# Patient Record
Sex: FEMALE | NOT HISPANIC OR LATINO | Employment: OTHER | URBAN - METROPOLITAN AREA
[De-identification: names, ages, dates, MRNs, and addresses within clinical notes are randomized per-mention and may not be internally consistent; named-entity substitution may affect disease eponyms.]

---

## 2023-03-01 LAB
ALBUMIN (G/DL) IN SER/PLAS: 4.1 G/DL (ref 3.4–5)
ANION GAP IN SER/PLAS: 15 MMOL/L (ref 10–20)
CALCIUM (MG/DL) IN SER/PLAS: 9.6 MG/DL (ref 8.6–10.6)
CARBON DIOXIDE, TOTAL (MMOL/L) IN SER/PLAS: 27 MMOL/L (ref 21–32)
CHLORIDE (MMOL/L) IN SER/PLAS: 105 MMOL/L (ref 98–107)
CREATININE (MG/DL) IN SER/PLAS: 1.51 MG/DL (ref 0.5–1.05)
GFR FEMALE: 37 ML/MIN/1.73M2
GLUCOSE (MG/DL) IN SER/PLAS: 109 MG/DL (ref 74–99)
PHOSPHATE (MG/DL) IN SER/PLAS: 4.2 MG/DL (ref 2.5–4.9)
POTASSIUM (MMOL/L) IN SER/PLAS: 3.9 MMOL/L (ref 3.5–5.3)
SODIUM (MMOL/L) IN SER/PLAS: 143 MMOL/L (ref 136–145)
UREA NITROGEN (MG/DL) IN SER/PLAS: 17 MG/DL (ref 6–23)

## 2023-04-28 LAB
ALBUMIN (G/DL) IN SER/PLAS: 4.2 G/DL (ref 3.4–5)
ALBUMIN (MG/L) IN URINE: 24.5 MG/L
ALBUMIN/CREATININE (UG/MG) IN URINE: 34.2 UG/MG CRT (ref 0–30)
ANION GAP IN SER/PLAS: 13 MMOL/L (ref 10–20)
APPEARANCE, URINE: CLEAR
BILIRUBIN, URINE: NEGATIVE
BLOOD, URINE: NEGATIVE
CALCIDIOL (25 OH VITAMIN D3) (NG/ML) IN SER/PLAS: 38 NG/ML
CALCIUM (MG/DL) IN SER/PLAS: 9.4 MG/DL (ref 8.6–10.6)
CARBON DIOXIDE, TOTAL (MMOL/L) IN SER/PLAS: 26 MMOL/L (ref 21–32)
CHLORIDE (MMOL/L) IN SER/PLAS: 106 MMOL/L (ref 98–107)
COLOR, URINE: YELLOW
CREATININE (MG/DL) IN SER/PLAS: 1.44 MG/DL (ref 0.5–1.05)
CREATININE (MG/DL) IN URINE: 71.6 MG/DL (ref 20–320)
CREATININE (MG/DL) IN URINE: 71.6 MG/DL (ref 20–320)
ERYTHROCYTE DISTRIBUTION WIDTH (RATIO) BY AUTOMATED COUNT: 14.5 % (ref 11.5–14.5)
ERYTHROCYTE MEAN CORPUSCULAR HEMOGLOBIN CONCENTRATION (G/DL) BY AUTOMATED: 32.6 G/DL (ref 32–36)
ERYTHROCYTE MEAN CORPUSCULAR VOLUME (FL) BY AUTOMATED COUNT: 95 FL (ref 80–100)
ERYTHROCYTES (10*6/UL) IN BLOOD BY AUTOMATED COUNT: 4.03 X10E12/L (ref 4–5.2)
GFR FEMALE: 39 ML/MIN/1.73M2
GLUCOSE (MG/DL) IN SER/PLAS: 101 MG/DL (ref 74–99)
GLUCOSE, URINE: NEGATIVE MG/DL
HEMATOCRIT (%) IN BLOOD BY AUTOMATED COUNT: 38.3 % (ref 36–46)
HEMOGLOBIN (G/DL) IN BLOOD: 12.5 G/DL (ref 12–16)
KETONES, URINE: NEGATIVE MG/DL
LEUKOCYTE ESTERASE, URINE: NEGATIVE
LEUKOCYTES (10*3/UL) IN BLOOD BY AUTOMATED COUNT: 8.9 X10E9/L (ref 4.4–11.3)
NITRITE, URINE: NEGATIVE
NRBC (PER 100 WBCS) BY AUTOMATED COUNT: 0 /100 WBC (ref 0–0)
PARATHYRIN INTACT (PG/ML) IN SER/PLAS: 70.2 PG/ML (ref 18.5–88)
PH, URINE: 6 (ref 5–8)
PHOSPHATE (MG/DL) IN SER/PLAS: 3.6 MG/DL (ref 2.5–4.9)
PLATELETS (10*3/UL) IN BLOOD AUTOMATED COUNT: 277 X10E9/L (ref 150–450)
POTASSIUM (MMOL/L) IN SER/PLAS: 4.3 MMOL/L (ref 3.5–5.3)
PROTEIN (MG/DL) IN URINE: 20 MG/DL (ref 5–24)
PROTEIN, URINE: NEGATIVE MG/DL
PROTEIN/CREATININE (MG/MG) IN URINE: 0.28 MG/MG CREAT (ref 0–0.17)
SODIUM (MMOL/L) IN SER/PLAS: 141 MMOL/L (ref 136–145)
SPECIFIC GRAVITY, URINE: 1.01 (ref 1–1.03)
UREA NITROGEN (MG/DL) IN SER/PLAS: 21 MG/DL (ref 6–23)
UROBILINOGEN, URINE: <2 MG/DL (ref 0–1.9)

## 2023-07-20 ENCOUNTER — HOSPITAL ENCOUNTER (OUTPATIENT)
Dept: DATA CONVERSION | Facility: HOSPITAL | Age: 68
End: 2023-07-20
Attending: INTERNAL MEDICINE
Payer: MEDICARE

## 2023-07-20 DIAGNOSIS — D12.0 BENIGN NEOPLASM OF CECUM: ICD-10-CM

## 2023-07-20 DIAGNOSIS — D12.8 BENIGN NEOPLASM OF RECTUM: ICD-10-CM

## 2023-07-20 DIAGNOSIS — K57.30 DIVERTICULOSIS OF LARGE INTESTINE WITHOUT PERFORATION OR ABSCESS WITHOUT BLEEDING: ICD-10-CM

## 2023-07-20 DIAGNOSIS — D12.5 BENIGN NEOPLASM OF SIGMOID COLON: ICD-10-CM

## 2023-07-20 DIAGNOSIS — Z12.11 ENCOUNTER FOR SCREENING FOR MALIGNANT NEOPLASM OF COLON: ICD-10-CM

## 2023-07-20 DIAGNOSIS — D12.4 BENIGN NEOPLASM OF DESCENDING COLON: ICD-10-CM

## 2023-08-02 LAB
COMPLETE PATHOLOGY REPORT: NORMAL
CONVERTED CLINICAL DIAGNOSIS-HISTORY: NORMAL
CONVERTED FINAL DIAGNOSIS: NORMAL
CONVERTED FINAL REPORT PDF LINK TO COPY AND PASTE: NORMAL
CONVERTED GROSS DESCRIPTION: NORMAL

## 2023-10-03 DIAGNOSIS — E03.9 HYPOTHYROIDISM, UNSPECIFIED TYPE: Primary | ICD-10-CM

## 2023-10-03 RX ORDER — LEVOTHYROXINE SODIUM 150 UG/1
150 TABLET ORAL DAILY
COMMUNITY
Start: 2013-06-19 | End: 2023-10-04 | Stop reason: SDUPTHER

## 2023-10-03 RX ORDER — LEVOTHYROXINE SODIUM 150 UG/1
150 TABLET ORAL DAILY
Qty: 90 TABLET | Refills: 0 | Status: CANCELLED | OUTPATIENT
Start: 2023-10-03

## 2023-10-04 DIAGNOSIS — E03.9 HYPOTHYROIDISM, UNSPECIFIED TYPE: Primary | ICD-10-CM

## 2023-10-04 RX ORDER — LEVOTHYROXINE SODIUM 150 UG/1
150 TABLET ORAL DAILY
Qty: 90 TABLET | Refills: 3 | Status: SHIPPED | OUTPATIENT
Start: 2023-10-04 | End: 2024-01-15 | Stop reason: DRUGHIGH

## 2023-12-12 ENCOUNTER — LAB (OUTPATIENT)
Dept: LAB | Facility: LAB | Age: 68
End: 2023-12-12
Payer: MEDICARE

## 2023-12-12 DIAGNOSIS — N18.30 CHRONIC KIDNEY DISEASE, STAGE 3 UNSPECIFIED (MULTI): Primary | ICD-10-CM

## 2023-12-12 LAB
ALBUMIN SERPL BCP-MCNC: 4.3 G/DL (ref 3.4–5)
ANION GAP SERPL CALC-SCNC: 15 MMOL/L (ref 10–20)
APPEARANCE UR: CLEAR
BILIRUB UR STRIP.AUTO-MCNC: NEGATIVE MG/DL
BUN SERPL-MCNC: 18 MG/DL (ref 6–23)
CALCIUM SERPL-MCNC: 9.7 MG/DL (ref 8.6–10.6)
CHLORIDE SERPL-SCNC: 105 MMOL/L (ref 98–107)
CO2 SERPL-SCNC: 26 MMOL/L (ref 21–32)
COLOR UR: YELLOW
CREAT SERPL-MCNC: 1.51 MG/DL (ref 0.5–1.05)
CREAT UR-MCNC: 73.9 MG/DL (ref 20–320)
CREAT UR-MCNC: 73.9 MG/DL (ref 20–320)
ERYTHROCYTE [DISTWIDTH] IN BLOOD BY AUTOMATED COUNT: 14.3 % (ref 11.5–14.5)
GFR SERPL CREATININE-BSD FRML MDRD: 37 ML/MIN/1.73M*2
GLUCOSE SERPL-MCNC: 108 MG/DL (ref 74–99)
GLUCOSE UR STRIP.AUTO-MCNC: NEGATIVE MG/DL
HCT VFR BLD AUTO: 36.1 % (ref 36–46)
HGB BLD-MCNC: 12.3 G/DL (ref 12–16)
HYALINE CASTS #/AREA URNS AUTO: ABNORMAL /LPF
KETONES UR STRIP.AUTO-MCNC: NEGATIVE MG/DL
LEUKOCYTE ESTERASE UR QL STRIP.AUTO: ABNORMAL
MAGNESIUM SERPL-MCNC: 2.16 MG/DL (ref 1.6–2.4)
MCH RBC QN AUTO: 31.2 PG (ref 26–34)
MCHC RBC AUTO-ENTMCNC: 34.1 G/DL (ref 32–36)
MCV RBC AUTO: 92 FL (ref 80–100)
MICROALBUMIN UR-MCNC: 96.1 MG/L
MICROALBUMIN/CREAT UR: 130 UG/MG CREAT
NITRITE UR QL STRIP.AUTO: NEGATIVE
NRBC BLD-RTO: 0 /100 WBCS (ref 0–0)
PH UR STRIP.AUTO: 6 [PH]
PHOSPHATE SERPL-MCNC: 4.2 MG/DL (ref 2.5–4.9)
PLATELET # BLD AUTO: 235 X10*3/UL (ref 150–450)
POTASSIUM SERPL-SCNC: 4 MMOL/L (ref 3.5–5.3)
PROT UR STRIP.AUTO-MCNC: ABNORMAL MG/DL
PROT UR-ACNC: 39 MG/DL (ref 5–24)
PROT/CREAT UR: 0.53 MG/MG CREAT (ref 0–0.17)
RBC # BLD AUTO: 3.94 X10*6/UL (ref 4–5.2)
RBC # UR STRIP.AUTO: NEGATIVE /UL
RBC #/AREA URNS AUTO: ABNORMAL /HPF
SODIUM SERPL-SCNC: 142 MMOL/L (ref 136–145)
SP GR UR STRIP.AUTO: 1.01
SQUAMOUS #/AREA URNS AUTO: ABNORMAL /HPF
UROBILINOGEN UR STRIP.AUTO-MCNC: <2 MG/DL
WBC # BLD AUTO: 5.7 X10*3/UL (ref 4.4–11.3)
WBC #/AREA URNS AUTO: ABNORMAL /HPF

## 2023-12-12 PROCEDURE — 36415 COLL VENOUS BLD VENIPUNCTURE: CPT

## 2023-12-12 PROCEDURE — 84156 ASSAY OF PROTEIN URINE: CPT

## 2023-12-12 PROCEDURE — 80069 RENAL FUNCTION PANEL: CPT

## 2023-12-12 PROCEDURE — 82570 ASSAY OF URINE CREATININE: CPT

## 2023-12-12 PROCEDURE — 81001 URINALYSIS AUTO W/SCOPE: CPT

## 2023-12-12 PROCEDURE — 85027 COMPLETE CBC AUTOMATED: CPT

## 2023-12-12 PROCEDURE — 82043 UR ALBUMIN QUANTITATIVE: CPT

## 2023-12-12 PROCEDURE — 83735 ASSAY OF MAGNESIUM: CPT

## 2023-12-18 ENCOUNTER — TELEMEDICINE (OUTPATIENT)
Dept: NEPHROLOGY | Facility: CLINIC | Age: 68
End: 2023-12-18
Payer: MEDICARE

## 2023-12-18 DIAGNOSIS — N18.32 HYPERTENSIVE KIDNEY DISEASE WITH STAGE 3B CHRONIC KIDNEY DISEASE (MULTI): Primary | ICD-10-CM

## 2023-12-18 DIAGNOSIS — I12.9 HYPERTENSIVE KIDNEY DISEASE WITH STAGE 3B CHRONIC KIDNEY DISEASE (MULTI): Primary | ICD-10-CM

## 2023-12-18 DIAGNOSIS — E55.9 VITAMIN D DEFICIENCY: Primary | ICD-10-CM

## 2023-12-18 PROCEDURE — 99441 PR PHYS/QHP TELEPHONE EVALUATION 5-10 MIN: CPT | Performed by: INTERNAL MEDICINE

## 2023-12-18 RX ORDER — LISINOPRIL 40 MG/1
40 TABLET ORAL DAILY
Qty: 90 TABLET | Refills: 1 | Status: SHIPPED | OUTPATIENT
Start: 2023-12-18 | End: 2024-12-17

## 2023-12-18 NOTE — PROGRESS NOTES
An interactive audio telecommunication system which permits real time communications between the patient (at the originating site) and provider (at the distant site) was utilized to provide this telehealth service.  Verbal consent was requested and obtained from patient on this date for a telehealth visit.    Chief complaint: Follow up CKD    No new complaints  No recent hospitalizations  Denies nausea, vomiting, chest pain, dyspnea  No urinary symptoms     Home Blood pressures 170s, generally 140-150s  Weight stable  No edema    Stage CKD 3b, stable  Mild proteinuria  HTN not at goal    Increase in lisinopril to 40 mg daily  Labs in 2 weeks  Labs and follow up in 3 months    Phone visit 8 minutes

## 2023-12-19 RX ORDER — ACETAMINOPHEN 500 MG
TABLET ORAL DAILY
Qty: 90 CAPSULE | Refills: 1 | Status: SHIPPED | OUTPATIENT
Start: 2023-12-19 | End: 2024-05-06

## 2024-01-04 PROBLEM — E66.01 MORBID OBESITY (MULTI): Status: ACTIVE | Noted: 2024-01-04

## 2024-01-04 PROBLEM — L30.9 ECZEMA: Status: ACTIVE | Noted: 2024-01-04

## 2024-01-04 PROBLEM — E55.9 VITAMIN D DEFICIENCY: Status: ACTIVE | Noted: 2024-01-04

## 2024-01-04 PROBLEM — E87.6 HYPOKALEMIA: Status: ACTIVE | Noted: 2024-01-04

## 2024-01-04 PROBLEM — E78.1 ESSENTIAL HYPERTRIGLYCERIDEMIA: Status: ACTIVE | Noted: 2024-01-04

## 2024-01-04 PROBLEM — L73.9 FOLLICULITIS: Status: ACTIVE | Noted: 2024-01-04

## 2024-01-04 PROBLEM — N39.0 URINARY TRACT INFECTION, ACUTE: Status: ACTIVE | Noted: 2024-01-04

## 2024-01-04 PROBLEM — M54.50 LOW BACK PAIN: Status: ACTIVE | Noted: 2024-01-04

## 2024-01-04 PROBLEM — I10 HYPERTENSION: Status: ACTIVE | Noted: 2024-01-04

## 2024-01-04 PROBLEM — E78.2 MIXED HYPERLIPIDEMIA: Status: ACTIVE | Noted: 2024-01-04

## 2024-01-04 PROBLEM — E66.9 OBESITY: Status: ACTIVE | Noted: 2024-01-04

## 2024-01-04 PROBLEM — F41.9 ANXIETY: Status: ACTIVE | Noted: 2024-01-04

## 2024-01-04 PROBLEM — N28.9 ABNORMAL KIDNEY FUNCTION: Status: ACTIVE | Noted: 2024-01-04

## 2024-01-04 PROBLEM — M54.16 LUMBAR RADICULOPATHY: Status: ACTIVE | Noted: 2024-01-04

## 2024-01-04 PROBLEM — G57.12 MERALGIA PARESTHETICA OF LEFT SIDE: Status: ACTIVE | Noted: 2024-01-04

## 2024-01-04 PROBLEM — H25.11 AGE-RELATED NUCLEAR CATARACT OF RIGHT EYE: Status: ACTIVE | Noted: 2019-04-01

## 2024-01-04 PROBLEM — C95.91 LEUKEMIA IN REMISSION (MULTI): Status: ACTIVE | Noted: 2024-01-04

## 2024-01-04 PROBLEM — M16.0 PRIMARY OSTEOARTHRITIS OF BOTH HIPS: Status: ACTIVE | Noted: 2024-01-04

## 2024-01-04 PROBLEM — E03.9 HYPOTHYROIDISM: Status: ACTIVE | Noted: 2024-01-04

## 2024-01-04 PROBLEM — H25.12 NUCLEAR SCLEROTIC CATARACT OF LEFT EYE: Status: ACTIVE | Noted: 2019-04-08

## 2024-01-04 PROBLEM — R73.9 HYPERGLYCEMIA: Status: ACTIVE | Noted: 2024-01-04

## 2024-01-04 PROBLEM — I10 BENIGN ESSENTIAL HYPERTENSION: Status: ACTIVE | Noted: 2024-01-04

## 2024-01-04 PROBLEM — N18.30 CKD (CHRONIC KIDNEY DISEASE) STAGE 3, GFR 30-59 ML/MIN (MULTI): Status: ACTIVE | Noted: 2024-01-04

## 2024-01-04 RX ORDER — FLUTICASONE PROPIONATE 50 MCG
SPRAY, SUSPENSION (ML) NASAL
COMMUNITY
End: 2024-04-19 | Stop reason: WASHOUT

## 2024-01-04 RX ORDER — FAMOTIDINE 20 MG/1
20 TABLET, FILM COATED ORAL DAILY
COMMUNITY
Start: 2023-07-31 | End: 2024-01-11 | Stop reason: ALTCHOICE

## 2024-01-04 RX ORDER — AMLODIPINE BESYLATE 10 MG/1
TABLET ORAL EVERY 24 HOURS
COMMUNITY
End: 2024-04-19 | Stop reason: ALTCHOICE

## 2024-01-04 RX ORDER — CELECOXIB 100 MG/1
1 CAPSULE ORAL EVERY 12 HOURS
COMMUNITY
End: 2024-01-11 | Stop reason: ALTCHOICE

## 2024-01-04 RX ORDER — IMATINIB MESYLATE 400 MG/1
400 TABLET, FILM COATED ORAL EVERY 24 HOURS
COMMUNITY

## 2024-01-04 RX ORDER — POTASSIUM CHLORIDE 750 MG/1
50 TABLET, EXTENDED RELEASE ORAL DAILY
COMMUNITY
Start: 2023-10-02

## 2024-01-11 ENCOUNTER — OFFICE VISIT (OUTPATIENT)
Dept: GERIATRIC MEDICINE | Facility: CLINIC | Age: 69
End: 2024-01-11
Payer: MEDICARE

## 2024-01-11 VITALS
TEMPERATURE: 98.2 F | RESPIRATION RATE: 18 BRPM | BODY MASS INDEX: 37.06 KG/M2 | DIASTOLIC BLOOD PRESSURE: 102 MMHG | WEIGHT: 229.6 LBS | SYSTOLIC BLOOD PRESSURE: 173 MMHG

## 2024-01-11 DIAGNOSIS — I12.9 HYPERTENSIVE KIDNEY DISEASE WITH STAGE 3B CHRONIC KIDNEY DISEASE (MULTI): ICD-10-CM

## 2024-01-11 DIAGNOSIS — N18.32 HYPERTENSIVE KIDNEY DISEASE WITH STAGE 3B CHRONIC KIDNEY DISEASE (MULTI): ICD-10-CM

## 2024-01-11 DIAGNOSIS — E03.9 HYPOTHYROIDISM, UNSPECIFIED TYPE: ICD-10-CM

## 2024-01-11 DIAGNOSIS — Z00.00 GENERAL MEDICAL EXAMINATION: Primary | ICD-10-CM

## 2024-01-11 DIAGNOSIS — Z12.31 SCREENING MAMMOGRAM, ENCOUNTER FOR: ICD-10-CM

## 2024-01-11 LAB
ALBUMIN SERPL BCP-MCNC: 4.4 G/DL (ref 3.4–5)
ANION GAP SERPL CALC-SCNC: 16 MMOL/L (ref 10–20)
BUN SERPL-MCNC: 20 MG/DL (ref 6–23)
CALCIUM SERPL-MCNC: 9.8 MG/DL (ref 8.6–10.6)
CHLORIDE SERPL-SCNC: 104 MMOL/L (ref 98–107)
CO2 SERPL-SCNC: 26 MMOL/L (ref 21–32)
CREAT SERPL-MCNC: 1.56 MG/DL (ref 0.5–1.05)
EGFRCR SERPLBLD CKD-EPI 2021: 36 ML/MIN/1.73M*2
ERYTHROCYTE [DISTWIDTH] IN BLOOD BY AUTOMATED COUNT: 14.2 % (ref 11.5–14.5)
GLUCOSE SERPL-MCNC: 104 MG/DL (ref 74–99)
HCT VFR BLD AUTO: 38 % (ref 36–46)
HGB BLD-MCNC: 12.6 G/DL (ref 12–16)
MCH RBC QN AUTO: 30.9 PG (ref 26–34)
MCHC RBC AUTO-ENTMCNC: 33.2 G/DL (ref 32–36)
MCV RBC AUTO: 93 FL (ref 80–100)
NRBC BLD-RTO: 0 /100 WBCS (ref 0–0)
PHOSPHATE SERPL-MCNC: 3.7 MG/DL (ref 2.5–4.9)
PLATELET # BLD AUTO: 240 X10*3/UL (ref 150–450)
POTASSIUM SERPL-SCNC: 3.7 MMOL/L (ref 3.5–5.3)
RBC # BLD AUTO: 4.08 X10*6/UL (ref 4–5.2)
SODIUM SERPL-SCNC: 142 MMOL/L (ref 136–145)
T4 FREE SERPL-MCNC: 1.47 NG/DL (ref 0.78–1.48)
TSH SERPL-ACNC: 0.07 MIU/L (ref 0.44–3.98)
WBC # BLD AUTO: 6.8 X10*3/UL (ref 4.4–11.3)

## 2024-01-11 PROCEDURE — 1036F TOBACCO NON-USER: CPT | Performed by: NURSE PRACTITIONER

## 2024-01-11 PROCEDURE — 1159F MED LIST DOCD IN RCRD: CPT | Performed by: NURSE PRACTITIONER

## 2024-01-11 PROCEDURE — 36415 COLL VENOUS BLD VENIPUNCTURE: CPT | Performed by: NURSE PRACTITIONER

## 2024-01-11 PROCEDURE — 84443 ASSAY THYROID STIM HORMONE: CPT | Performed by: NURSE PRACTITIONER

## 2024-01-11 PROCEDURE — 99215 OFFICE O/P EST HI 40 MIN: CPT | Performed by: NURSE PRACTITIONER

## 2024-01-11 PROCEDURE — 3077F SYST BP >= 140 MM HG: CPT | Performed by: NURSE PRACTITIONER

## 2024-01-11 PROCEDURE — 85027 COMPLETE CBC AUTOMATED: CPT | Performed by: INTERNAL MEDICINE

## 2024-01-11 PROCEDURE — 1126F AMNT PAIN NOTED NONE PRSNT: CPT | Performed by: NURSE PRACTITIONER

## 2024-01-11 PROCEDURE — 84439 ASSAY OF FREE THYROXINE: CPT | Performed by: NURSE PRACTITIONER

## 2024-01-11 PROCEDURE — 3080F DIAST BP >= 90 MM HG: CPT | Performed by: NURSE PRACTITIONER

## 2024-01-11 PROCEDURE — 84100 ASSAY OF PHOSPHORUS: CPT | Performed by: INTERNAL MEDICINE

## 2024-01-11 ASSESSMENT — ENCOUNTER SYMPTOMS
DYSPHORIC MOOD: 0
TROUBLE SWALLOWING: 0
SLEEP DISTURBANCE: 0
COUGH: 0
NUMBNESS: 0
TREMORS: 0
WEAKNESS: 0
BLOOD IN STOOL: 0
DECREASED CONCENTRATION: 0
RECTAL PAIN: 0
SINUS PAIN: 0
DYSURIA: 0
COLOR CHANGE: 0
SINUS PRESSURE: 0
DIARRHEA: 0
CHEST TIGHTNESS: 0
RHINORRHEA: 0
HEMATURIA: 0
SEIZURES: 0
FATIGUE: 0
PALPITATIONS: 1
APPETITE CHANGE: 0
HEADACHES: 0
CONFUSION: 0
LOSS OF SENSATION IN FEET: 0
NAUSEA: 0
SPEECH DIFFICULTY: 0
DIZZINESS: 0
OCCASIONAL FEELINGS OF UNSTEADINESS: 0
CONSTIPATION: 0
CHOKING: 0
FEVER: 0
SHORTNESS OF BREATH: 0
WHEEZING: 0
BRUISES/BLEEDS EASILY: 0

## 2024-01-11 ASSESSMENT — PATIENT HEALTH QUESTIONNAIRE - PHQ9
2. FEELING DOWN, DEPRESSED OR HOPELESS: NOT AT ALL
SUM OF ALL RESPONSES TO PHQ9 QUESTIONS 1 AND 2: 0
1. LITTLE INTEREST OR PLEASURE IN DOING THINGS: NOT AT ALL

## 2024-01-11 ASSESSMENT — PAIN SCALES - GENERAL: PAINLEVEL: 0-NO PAIN

## 2024-01-11 NOTE — PROGRESS NOTES
Subjective   Patient ID: Cleo Corral is a 68 y.o. female who presents for No chief complaint on file..      HPI Seen today for follow up primary care visit. Ms. Corral was last seen by me 7/6/23.  Per patient discussion/provider impression:  Having more epigastric discomfort- begin famotidine, if this is not helpful will change to omeprazole.  HTN: BP  fairly well controlled  Needs thyroid checked due to hypothyroid  CKD: follows with nephrology  Colonoscopy done July 2023: several polyps removed - pathology shows tubular ademomas, diverticulosis was also noted. Plan is for follow up colonoscopy in 6 months, this is scheduled for Feb 15th.  Shingrix series completed   Hyperglycemia: HgbA1c stable at 5.6%  Knee pain: improved with steroid injections      Since last visit:  Established care with Heme-onc at [facility] (due to insurance changes) for CML. She continues on imatnib mesylate 400mg daily. She is having labs monitored. Needs echo due to murmur. CBC stable, no need for transfusion. CKD stable, followed by nephrology at   Seen by nephrology, Dr. Pruitt (12/28/23)- continue ACE- lisinopril increased to 40mg due to elevated BP. Labs checked, EFGR: 37    Things to consider this visit:  TSH  Mammogram  PCV-20     Review of Systems   Constitutional:  Negative for appetite change, diaphoresis, fatigue and fever.   HENT:  Negative for congestion, postnasal drip, rhinorrhea, sinus pressure, sinus pain and trouble swallowing.    Eyes:  Negative for visual disturbance.        Follows with retinal specialist- last seen November 2023   Respiratory:  Negative for cough, choking, chest tightness, shortness of breath and wheezing.    Cardiovascular:  Positive for palpitations. Negative for leg swelling.        Occasional palpitations when lying down- present for a few months- resolves quickly without intervention and no associated symptoms    Gastrointestinal:  Negative for blood in stool, constipation, diarrhea, nausea  and rectal pain.        Heartburn has resolved - no longer taking pepcid    Genitourinary:  Negative for dysuria, hematuria, pelvic pain, urgency, vaginal bleeding, vaginal discharge and vaginal pain.   Musculoskeletal:         No joint pain- hasn't required any repeat steroid injection   Skin:  Negative for color change and rash.   Allergic/Immunologic: Negative for environmental allergies.   Neurological:  Negative for dizziness, tremors, seizures, syncope, speech difficulty, weakness, numbness and headaches.   Hematological:  Does not bruise/bleed easily.   Psychiatric/Behavioral:  Negative for behavioral problems, confusion, decreased concentration, dysphoric mood and sleep disturbance.        Objective   Physical Exam  Constitutional:       Appearance: Normal appearance.   HENT:      Head: Normocephalic and atraumatic.   Eyes:      Extraocular Movements: Extraocular movements intact.      Conjunctiva/sclera: Conjunctivae normal.      Pupils: Pupils are equal, round, and reactive to light.   Musculoskeletal:      Cervical back: Normal range of motion.   Skin:     General: Skin is warm and dry.      Comments: Small fluid filled cyst on chest, mobile, soft, NT, unable to express any drainage, no discoloration.   Neurological:      General: No focal deficit present.      Mental Status: She is alert and oriented to person, place, and time.   Psychiatric:         Mood and Affect: Mood normal.         Behavior: Behavior normal.         Thought Content: Thought content normal.         Judgment: Judgment normal.         Assessment/Plan      # Hypertension  - BP not at goal. On arrival BP quite elevated, improvement after sitting for several minutes  - Lisinopril recently increased to 40 mg daily   - Continues on amlodipine 10mg daily  - Ms. Corral will monitor her blood pressures at home   - RFP ordered by Dr. 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

## 2024-01-11 NOTE — PATIENT INSTRUCTIONS
High blood pressure     Your blood pressure was initially high but then improved on recheck  Continue Lisinopril and amlodipine  Monitor your blood pressure  EKG today was okay  You have a murmur- I will order echocardiogram at next visit   Follow up in 3 months for BP check     Hypothyroid  - check TSH today     Osteoarthritis  no complaints of pain since your knee steroid injections   2. it is safe to use acetaminophen for pain     Renal disease  - continue follow up with Dr. Pruitt    Heartburn   Resolved, use famotidine (Pepcid) if needed     Health Maintenance  mammogram ordered today  2.  EKG done today  3. you have repeat colonoscopy in February   4. PCV-20 at [facility]    Follow up in 3 months or sooner if needed

## 2024-01-11 NOTE — ASSESSMENT & PLAN NOTE
Recent adjustment in blood pressure medications due to persistent elevated blood pressure. Initial BP was elevated, repeat /84.  2.   Continue to monitor your blood pressure  3.  Follow up in 3 months for BP check   4. You have a murmur, EKG today showed some changes related to your high blood pressure (left ventricular hypertrophy)- I will order  an echocardiogram at your next visit

## 2024-01-12 ENCOUNTER — TELEPHONE (OUTPATIENT)
Dept: GERIATRIC MEDICINE | Facility: CLINIC | Age: 69
End: 2024-01-12
Payer: MEDICARE

## 2024-01-12 NOTE — TELEPHONE ENCOUNTER
Pt called and stated that both of you did a wonderful job yesterday. She stated she wants to thank both of you for the time and effort that you both took to care for her. She is very thankful and appreciative. :-)

## 2024-01-13 ASSESSMENT — ENCOUNTER SYMPTOMS: DIAPHORESIS: 0

## 2024-01-15 ENCOUNTER — DOCUMENTATION (OUTPATIENT)
Dept: GERIATRIC MEDICINE | Facility: CLINIC | Age: 69
End: 2024-01-15
Payer: MEDICARE

## 2024-01-15 DIAGNOSIS — E03.9 ACQUIRED HYPOTHYROIDISM: Primary | ICD-10-CM

## 2024-01-15 RX ORDER — LEVOTHYROXINE SODIUM 137 UG/1
137 TABLET ORAL DAILY
Qty: 90 TABLET | Refills: 3 | Status: SHIPPED | OUTPATIENT
Start: 2024-01-15 | End: 2024-04-12 | Stop reason: DRUGHIGH

## 2024-01-15 NOTE — PROGRESS NOTES
Phone call with Ms. Corral to review lab work today.     Plan to decrease levothyroxine to 137mcg daily and recheck labs in 6-8 weeks.   Ms. Shayna  verbalizes understanding and has no questions at this time.

## 2024-01-17 ENCOUNTER — LAB (OUTPATIENT)
Dept: LAB | Facility: LAB | Age: 69
End: 2024-01-17
Payer: MEDICARE

## 2024-01-17 LAB
APPEARANCE UR: CLEAR
BILIRUB UR STRIP.AUTO-MCNC: NEGATIVE MG/DL
COLOR UR: COLORLESS
CREAT UR-MCNC: 41.3 MG/DL
GLUCOSE UR STRIP.AUTO-MCNC: NORMAL MG/DL
KETONES UR STRIP.AUTO-MCNC: NEGATIVE MG/DL
LEUKOCYTE ESTERASE UR QL STRIP.AUTO: NEGATIVE
NITRITE UR QL STRIP.AUTO: NEGATIVE
PH UR STRIP.AUTO: 5.5 [PH]
PROT UR STRIP.AUTO-MCNC: NEGATIVE MG/DL
PROT UR-MCNC: 9 MG/DL
PROT/CREAT UR: NORMAL MG/G{CREAT}
RBC # UR STRIP.AUTO: NEGATIVE /UL
SP GR UR STRIP.AUTO: 1.01
UROBILINOGEN UR STRIP.AUTO-MCNC: NORMAL MG/DL

## 2024-01-17 PROCEDURE — 82570 ASSAY OF URINE CREATININE: CPT | Performed by: INTERNAL MEDICINE

## 2024-01-17 PROCEDURE — 81003 URINALYSIS AUTO W/O SCOPE: CPT

## 2024-01-26 ENCOUNTER — HOSPITAL ENCOUNTER (OUTPATIENT)
Dept: RADIOLOGY | Facility: CLINIC | Age: 69
Discharge: HOME | End: 2024-01-26
Payer: MEDICARE

## 2024-01-26 VITALS — WEIGHT: 229.28 LBS | BODY MASS INDEX: 36.85 KG/M2 | HEIGHT: 66 IN

## 2024-01-26 DIAGNOSIS — Z12.31 SCREENING MAMMOGRAM, ENCOUNTER FOR: ICD-10-CM

## 2024-01-26 DIAGNOSIS — Z00.00 GENERAL MEDICAL EXAMINATION: ICD-10-CM

## 2024-01-26 PROCEDURE — 77063 BREAST TOMOSYNTHESIS BI: CPT | Performed by: RADIOLOGY

## 2024-01-26 PROCEDURE — 77067 SCR MAMMO BI INCL CAD: CPT

## 2024-01-26 PROCEDURE — 77067 SCR MAMMO BI INCL CAD: CPT | Performed by: RADIOLOGY

## 2024-02-15 ENCOUNTER — HOSPITAL ENCOUNTER (OUTPATIENT)
Dept: GASTROENTEROLOGY | Facility: HOSPITAL | Age: 69
Setting detail: OUTPATIENT SURGERY
Discharge: HOME | End: 2024-02-15
Payer: MEDICARE

## 2024-02-15 VITALS
DIASTOLIC BLOOD PRESSURE: 80 MMHG | BODY MASS INDEX: 35.93 KG/M2 | HEIGHT: 66 IN | TEMPERATURE: 97.2 F | OXYGEN SATURATION: 94 % | HEART RATE: 82 BPM | RESPIRATION RATE: 16 BRPM | SYSTOLIC BLOOD PRESSURE: 111 MMHG | WEIGHT: 223.55 LBS

## 2024-02-15 DIAGNOSIS — K63.5 POLYP OF COLON: Primary | ICD-10-CM

## 2024-02-15 PROCEDURE — 2500000004 HC RX 250 GENERAL PHARMACY W/ HCPCS (ALT 636 FOR OP/ED): Performed by: INTERNAL MEDICINE

## 2024-02-15 PROCEDURE — 88305 TISSUE EXAM BY PATHOLOGIST: CPT | Performed by: PATHOLOGY

## 2024-02-15 PROCEDURE — 45381 COLONOSCOPY SUBMUCOUS NJX: CPT | Performed by: INTERNAL MEDICINE

## 2024-02-15 PROCEDURE — 45385 COLONOSCOPY W/LESION REMOVAL: CPT | Performed by: INTERNAL MEDICINE

## 2024-02-15 PROCEDURE — G0500 MOD SEDAT ENDO SERVICE >5YRS: HCPCS | Performed by: INTERNAL MEDICINE

## 2024-02-15 PROCEDURE — 0753T DGTZ GLS MCRSCP SLD LEVEL IV: CPT | Mod: TC,SUR,AHULAB | Performed by: INTERNAL MEDICINE

## 2024-02-15 PROCEDURE — 7100000009 HC PHASE TWO TIME - INITIAL BASE CHARGE

## 2024-02-15 PROCEDURE — 7100000010 HC PHASE TWO TIME - EACH INCREMENTAL 1 MINUTE

## 2024-02-15 PROCEDURE — 99153 MOD SED SAME PHYS/QHP EA: CPT | Performed by: INTERNAL MEDICINE

## 2024-02-15 PROCEDURE — 3700000012 HC SEDATION LEVEL 5+ TIME - INITIAL 15 MINUTES 5/> YEARS

## 2024-02-15 PROCEDURE — 3700000013 HC SEDATION LEVEL 5+ TIME - EACH ADDITIONAL 15 MINUTES

## 2024-02-15 RX ORDER — MIDAZOLAM HYDROCHLORIDE 1 MG/ML
INJECTION, SOLUTION INTRAMUSCULAR; INTRAVENOUS AS NEEDED
Status: COMPLETED | OUTPATIENT
Start: 2024-02-15 | End: 2024-02-15

## 2024-02-15 RX ORDER — MEPERIDINE HYDROCHLORIDE 25 MG/ML
INJECTION INTRAMUSCULAR; INTRAVENOUS; SUBCUTANEOUS AS NEEDED
Status: COMPLETED | OUTPATIENT
Start: 2024-02-15 | End: 2024-02-15

## 2024-02-15 RX ORDER — SODIUM CHLORIDE, SODIUM LACTATE, POTASSIUM CHLORIDE, CALCIUM CHLORIDE 600; 310; 30; 20 MG/100ML; MG/100ML; MG/100ML; MG/100ML
20 INJECTION, SOLUTION INTRAVENOUS CONTINUOUS
Status: DISCONTINUED | OUTPATIENT
Start: 2024-02-15 | End: 2024-02-16 | Stop reason: HOSPADM

## 2024-02-15 RX ADMIN — MIDAZOLAM HYDROCHLORIDE 1 MG: 1 INJECTION INTRAMUSCULAR; INTRAVENOUS at 10:40

## 2024-02-15 RX ADMIN — MEPERIDINE HYDROCHLORIDE 50 MG: 25 INJECTION INTRAMUSCULAR; INTRAVENOUS; SUBCUTANEOUS at 10:31

## 2024-02-15 RX ADMIN — MEPERIDINE HYDROCHLORIDE 25 MG: 25 INJECTION INTRAMUSCULAR; INTRAVENOUS; SUBCUTANEOUS at 10:40

## 2024-02-15 RX ADMIN — MIDAZOLAM HYDROCHLORIDE 2 MG: 1 INJECTION INTRAMUSCULAR; INTRAVENOUS at 10:31

## 2024-02-15 RX ADMIN — MEPERIDINE HYDROCHLORIDE 25 MG: 25 INJECTION INTRAMUSCULAR; INTRAVENOUS; SUBCUTANEOUS at 10:34

## 2024-02-15 RX ADMIN — MIDAZOLAM HYDROCHLORIDE 1 MG: 1 INJECTION INTRAMUSCULAR; INTRAVENOUS at 10:34

## 2024-02-15 ASSESSMENT — PAIN SCALES - GENERAL
PAINLEVEL_OUTOF10: 4
PAINLEVEL_OUTOF10: 0 - NO PAIN

## 2024-02-15 ASSESSMENT — COLUMBIA-SUICIDE SEVERITY RATING SCALE - C-SSRS
2. HAVE YOU ACTUALLY HAD ANY THOUGHTS OF KILLING YOURSELF?: NO
1. IN THE PAST MONTH, HAVE YOU WISHED YOU WERE DEAD OR WISHED YOU COULD GO TO SLEEP AND NOT WAKE UP?: NO
6. HAVE YOU EVER DONE ANYTHING, STARTED TO DO ANYTHING, OR PREPARED TO DO ANYTHING TO END YOUR LIFE?: NO

## 2024-02-15 ASSESSMENT — PAIN - FUNCTIONAL ASSESSMENT
PAIN_FUNCTIONAL_ASSESSMENT: 0-10

## 2024-02-15 NOTE — PERIOPERATIVE NURSING NOTE
1104 Arrival to post endo. Awake and alert. No complaints. Son brought to bedside.    1113 Dr. Holt at bedside to speak to pt and son.     1119 Discharge instructions reviewed with pt and son, verbalized understanding. Tolerating PO.     1134 PIV removed and pt getting dressed.    1151 Transported to [facility] via wheelchair and discharged to home with son.

## 2024-02-15 NOTE — DISCHARGE INSTRUCTIONS
Patient Instructions after a Colonoscopy      The anesthetics, sedatives or narcotics which were given to you today will be acting in your body for the next 24 hours, so you might feel a little sleepy or groggy.  This feeling should slowly wear off. Carefully read and follow the instructions.     You received sedation today:  - Do not drive or operate any machinery or power tools of any kind.   - No alcoholic beverages today, not even beer or wine.  - Do not make any important decisions or sign any legal documents.  - No over the counter medications that contain alcohol or that may cause drowsiness.  - Do not make any important decisions or sign any legal documents.    While it is common to experience mild to moderate abdominal distention, gas, or belching after your procedure, if any of these symptoms occur following discharge from the GI Lab or within one week of having your procedure, call the [facility] to be advised whether a visit to your nearest Urgent Care or Emergency Department is indicated.  Take this paper with you if you go.     - If you develop an allergic reaction to the medications that were given during your procedure such as difficulty breathing, rash, hives, severe nausea, vomiting or lightheadedness.  - If you experience chest pain, shortness of breath, severe abdominal pain, fevers and chills.  -If you develop signs and symptoms of bleeding such as blood in your spit, if your stools turn black, tarry, or bloody  - If you have not urinated within 8 hours following your procedure.  - If your IV site becomes painful, red, inflamed, or looks infected.    If you received a biopsy/polypectomy/sphincterotomy the following instructions apply below:    __ Do not use Aspirin containing products, non-steroidal medications or anti-coagulants for one week following your procedure. (Examples of these types of medications are: Advil, Arthrotec, Aleve, Coumadin, Ecotrin, Heparin, Ibuprofen,  Indocin, Motrin, Naprosyn, Nuprin, Plavix, Vioxx, and Voltarin, or their generic forms.  This list is not all-inclusive.  Check with your physician or pharmacist before resuming medications.)   __ Eat a soft diet today.  Avoid foods that are poorly digested for the next 24 hours.  These foods would include: nuts, beans, lettuce, red meats, and fried foods. Start with liquids and advance your diet as tolerated, gradually work up to eating solids.   __ Do not have a Barium Study or Enema for one week.    Your physician recommends the additional following instructions:    -You have a contact number available for emergencies. The signs and symptoms of potential delayed complications were discussed with you. You may return to normal activities tomorrow.  -Resume your previous diet.  -Continue your present medications.   -We are waiting for your pathology results.  -Your physician has recommended a repeat colonoscopy (date to be determined after pending pathology results are reviewed) for surveillance based on pathology results.  -The findings and recommendations have been discussed with you.  -The findings and recommendations were discussed with your family.  - Please see Medication Reconciliation Form for new medication/medications prescribed.       If you experience any problems or have any questions following discharge from the GI Lab, please call:    Aston Holt MD  [phone]      Nurse Signature                                                                        Date___________________                                                                            Patient/Responsible Party Signature                                        Date___________________

## 2024-02-15 NOTE — H&P
"History Of Present Illness  Cleo Corral is a 68 y.o. female presenting with hx polyps.     Past Medical History  Past Medical History:   Diagnosis Date    Encounter for gynecological examination (general) (routine) without abnormal findings 2018    Well woman exam with routine gynecological exam    Essential (primary) hypertension     HTN (hypertension), benign    Personal history of malignant neoplasm, unspecified     History of malignant neoplasm     Surgical History  Past Surgical History:   Procedure Laterality Date     SECTION, LOW TRANSVERSE  2015     Section Low Transverse    OTHER SURGICAL HISTORY  2019    Cataract surgery    OTHER SURGICAL HISTORY  2019    Hip replacement     Social History  She reports that she has never smoked. She has never used smokeless tobacco. No history on file for alcohol use and drug use.    Family History  Family History   Problem Relation Name Age of Onset    Breast cancer Mother's Sister          Allergies  No Known Allergies  Review of Systems  Pre-sedation Evaluation:  ASA Classification - ASA 3 - Patient with moderate systemic disease with functional limitations  Mallampati Score - II (hard and soft palate, upper portion of tonsils anduvula visible)    Physical Exam  Vitals reviewed.   Constitutional:       Appearance: Normal appearance.   Cardiovascular:      Rate and Rhythm: Normal rate and regular rhythm.      Heart sounds: Murmur heard.   Pulmonary:      Breath sounds: Normal breath sounds.   Abdominal:      General: Bowel sounds are normal.      Palpations: Abdomen is soft.      Tenderness: There is no abdominal tenderness.   Neurological:      Mental Status: She is alert.          Last Recorded Vitals  Pulse 91, temperature 36.4 °C (97.5 °F), temperature source Temporal, resp. rate 16, height 1.676 m (5' 6\"), weight 101 kg (223 lb 8.7 oz), SpO2 97 %.     Assessment/Plan   R/O neoplasm for colonoscopy     PTA/Current " Medications:  (Not in a hospital admission)    Current Outpatient Medications   Medication Sig Dispense Refill    imatinib (Gleevec) 100 mg tablet once every 24 hours.      levothyroxine (Synthroid, Levoxyl) 137 mcg tablet Take 1 tablet (137 mcg) by mouth once daily. 90 tablet 3    lisinopril 40 mg tablet Take 1 tablet (40 mg) by mouth once daily. 90 tablet 1    potassium chloride CR 10 mEq ER tablet       amLODIPine (Norvasc) 10 mg tablet once every 24 hours.      cholecalciferol (Vitamin D-3) 50 mcg (2,000 unit) capsule TAKE 1 CAPSULE BY MOUTH EVERY DAY 90 capsule 1    fluticasone (Flonase) 50 mcg/actuation nasal spray Administer into affected nostril(s).       No current facility-administered medications for this encounter.     Aston Holt MD

## 2024-02-16 ASSESSMENT — PAIN SCALES - GENERAL: PAINLEVEL_OUTOF10: 0 - NO PAIN

## 2024-02-16 NOTE — ADDENDUM NOTE
Encounter addended by: Carol Herman RN on: 2/16/2024 8:29 AM   Actions taken: Contacts section saved, Flowsheet macro applied, Flowsheet accepted

## 2024-02-18 DIAGNOSIS — N18.32 STAGE 3B CHRONIC KIDNEY DISEASE (MULTI): Primary | ICD-10-CM

## 2024-02-19 LAB
LABORATORY COMMENT REPORT: NORMAL
PATH REPORT.FINAL DX SPEC: NORMAL
PATH REPORT.GROSS SPEC: NORMAL
PATH REPORT.TOTAL CANCER: NORMAL

## 2024-02-20 RX ORDER — LISINOPRIL 20 MG/1
20 TABLET ORAL DAILY
Qty: 90 TABLET | Refills: 1 | Status: SHIPPED | OUTPATIENT
Start: 2024-02-20 | End: 2024-04-11 | Stop reason: DRUGHIGH

## 2024-04-11 ENCOUNTER — APPOINTMENT (OUTPATIENT)
Dept: GERIATRIC MEDICINE | Facility: CLINIC | Age: 69
End: 2024-04-11
Payer: MEDICARE

## 2024-04-11 ENCOUNTER — OFFICE VISIT (OUTPATIENT)
Dept: GERIATRIC MEDICINE | Facility: CLINIC | Age: 69
End: 2024-04-11
Payer: MEDICARE

## 2024-04-11 VITALS
DIASTOLIC BLOOD PRESSURE: 102 MMHG | SYSTOLIC BLOOD PRESSURE: 173 MMHG | BODY MASS INDEX: 37.03 KG/M2 | HEART RATE: 76 BPM | TEMPERATURE: 98.9 F | WEIGHT: 229.4 LBS | RESPIRATION RATE: 18 BRPM

## 2024-04-11 DIAGNOSIS — E87.6 HYPOKALEMIA: ICD-10-CM

## 2024-04-11 DIAGNOSIS — N18.32 HYPERTENSIVE KIDNEY DISEASE WITH STAGE 3B CHRONIC KIDNEY DISEASE (MULTI): ICD-10-CM

## 2024-04-11 DIAGNOSIS — E03.9 HYPOTHYROIDISM, UNSPECIFIED TYPE: Primary | ICD-10-CM

## 2024-04-11 DIAGNOSIS — I10 BENIGN ESSENTIAL HYPERTENSION: ICD-10-CM

## 2024-04-11 DIAGNOSIS — I12.9 HYPERTENSIVE KIDNEY DISEASE WITH STAGE 3B CHRONIC KIDNEY DISEASE (MULTI): ICD-10-CM

## 2024-04-11 PROBLEM — D12.4 BENIGN NEOPLASM OF DESCENDING COLON: Status: ACTIVE | Noted: 2023-07-20

## 2024-04-11 PROBLEM — D12.8 BENIGN NEOPLASM OF RECTUM: Status: ACTIVE | Noted: 2023-07-20

## 2024-04-11 PROBLEM — M21.379 FOOT DROP: Status: ACTIVE | Noted: 2024-04-11

## 2024-04-11 PROBLEM — E78.00 ELEVATED LOW DENSITY LIPOPROTEIN (LDL) CHOLESTEROL LEVEL: Status: ACTIVE | Noted: 2024-04-11

## 2024-04-11 PROBLEM — N63.25 UNSPECIFIED LUMP IN THE LEFT BREAST, OVERLAPPING QUADRANTS: Status: ACTIVE | Noted: 2022-10-28

## 2024-04-11 PROBLEM — N64.4 BREAST TENDERNESS: Status: ACTIVE | Noted: 2022-10-05

## 2024-04-11 PROBLEM — N93.9 ABNORMAL UTERINE BLEEDING: Status: ACTIVE | Noted: 2024-04-11

## 2024-04-11 PROBLEM — N18.30 STAGE 3 CHRONIC KIDNEY DISEASE (MULTI): Status: ACTIVE | Noted: 2022-06-10

## 2024-04-11 PROBLEM — R26.2 DIFFICULTY WALKING: Status: ACTIVE | Noted: 2024-04-11

## 2024-04-11 PROBLEM — M16.9 OSTEOARTHRITIS OF HIP: Status: ACTIVE | Noted: 2023-04-17

## 2024-04-11 PROBLEM — M25.569 KNEE PAIN: Status: ACTIVE | Noted: 2023-04-17

## 2024-04-11 PROBLEM — J30.2 SEASONAL ALLERGIC RHINITIS: Status: ACTIVE | Noted: 2024-04-11

## 2024-04-11 PROBLEM — K21.9 GASTROESOPHAGEAL REFLUX DISEASE WITHOUT ESOPHAGITIS: Status: ACTIVE | Noted: 2023-07-06

## 2024-04-11 PROBLEM — R79.9 ABNORMAL BLOOD CHEMISTRY LEVEL: Status: ACTIVE | Noted: 2022-10-05

## 2024-04-11 PROBLEM — N28.9 DISORDER OF KIDNEY AND URETER, UNSPECIFIED: Status: ACTIVE | Noted: 2022-06-10

## 2024-04-11 PROBLEM — Z85.9 HISTORY OF MALIGNANT NEOPLASM: Status: ACTIVE | Noted: 2024-04-11

## 2024-04-11 PROBLEM — E78.1 PRIMARY HYPERTRIGLYCERIDEMIA: Status: ACTIVE | Noted: 2022-06-10

## 2024-04-11 PROBLEM — G89.29 OTHER CHRONIC PAIN: Status: ACTIVE | Noted: 2023-07-06

## 2024-04-11 PROBLEM — M25.562 PAIN IN LEFT KNEE: Status: ACTIVE | Noted: 2023-04-17

## 2024-04-11 PROBLEM — M70.60 GREATER TROCHANTERIC BURSITIS: Status: ACTIVE | Noted: 2024-01-04

## 2024-04-11 PROBLEM — R35.0 INCREASED FREQUENCY OF URINATION: Status: ACTIVE | Noted: 2024-04-11

## 2024-04-11 PROBLEM — L29.9 PRURITUS: Status: ACTIVE | Noted: 2024-04-11

## 2024-04-11 PROBLEM — D12.0 BENIGN NEOPLASM OF CECUM: Status: ACTIVE | Noted: 2023-07-20

## 2024-04-11 PROBLEM — N95.0 POSTMENOPAUSAL BLEEDING: Status: ACTIVE | Noted: 2024-04-11

## 2024-04-11 PROBLEM — K57.30 DIVERTICULOSIS OF LARGE INTESTINE: Status: ACTIVE | Noted: 2023-07-20

## 2024-04-11 PROBLEM — G57.10 MERALGIA PARESTHETICA: Status: ACTIVE | Noted: 2024-04-11

## 2024-04-11 PROBLEM — R19.5 POSITIVE COLORECTAL CANCER SCREENING USING DNA-BASED STOOL TEST: Status: ACTIVE | Noted: 2024-04-11

## 2024-04-11 PROBLEM — R42 VERTIGO: Status: ACTIVE | Noted: 2024-04-11

## 2024-04-11 LAB
APPEARANCE UR: CLEAR
BILIRUB UR STRIP.AUTO-MCNC: NEGATIVE MG/DL
COLOR UR: ABNORMAL
CREAT UR-MCNC: 69.1 MG/DL (ref 20–320)
CREAT UR-MCNC: 69.1 MG/DL (ref 20–320)
ERYTHROCYTE [DISTWIDTH] IN BLOOD BY AUTOMATED COUNT: 14.8 % (ref 11.5–14.5)
GLUCOSE UR STRIP.AUTO-MCNC: NORMAL MG/DL
HCT VFR BLD AUTO: 38.3 % (ref 36–46)
HGB BLD-MCNC: 12.4 G/DL (ref 12–16)
KETONES UR STRIP.AUTO-MCNC: NEGATIVE MG/DL
LEUKOCYTE ESTERASE UR QL STRIP.AUTO: ABNORMAL
MCH RBC QN AUTO: 30.3 PG (ref 26–34)
MCHC RBC AUTO-ENTMCNC: 32.4 G/DL (ref 32–36)
MCV RBC AUTO: 94 FL (ref 80–100)
MICROALBUMIN UR-MCNC: 79.3 MG/L
MICROALBUMIN/CREAT UR: 114.8 UG/MG CREAT
MUCOUS THREADS #/AREA URNS AUTO: NORMAL /LPF
NITRITE UR QL STRIP.AUTO: NEGATIVE
NRBC BLD-RTO: 0 /100 WBCS (ref 0–0)
PH UR STRIP.AUTO: 6.5 [PH]
PLATELET # BLD AUTO: 231 X10*3/UL (ref 150–450)
PROT UR STRIP.AUTO-MCNC: ABNORMAL MG/DL
PROT UR-ACNC: 28 MG/DL (ref 5–24)
PROT/CREAT UR: 0.41 MG/MG CREAT (ref 0–0.17)
RBC # BLD AUTO: 4.09 X10*6/UL (ref 4–5.2)
RBC # UR STRIP.AUTO: NEGATIVE /UL
RBC #/AREA URNS AUTO: NORMAL /HPF
SP GR UR STRIP.AUTO: 1.01
SQUAMOUS #/AREA URNS AUTO: NORMAL /HPF
T4 FREE SERPL-MCNC: 1.49 NG/DL (ref 0.78–1.48)
TSH SERPL-ACNC: 0.08 MIU/L (ref 0.44–3.98)
UROBILINOGEN UR STRIP.AUTO-MCNC: NORMAL MG/DL
WBC # BLD AUTO: 8 X10*3/UL (ref 4.4–11.3)
WBC #/AREA URNS AUTO: NORMAL /HPF

## 2024-04-11 PROCEDURE — 1126F AMNT PAIN NOTED NONE PRSNT: CPT | Performed by: NURSE PRACTITIONER

## 2024-04-11 PROCEDURE — 36415 COLL VENOUS BLD VENIPUNCTURE: CPT | Performed by: NURSE PRACTITIONER

## 2024-04-11 PROCEDURE — 85027 COMPLETE CBC AUTOMATED: CPT | Performed by: NURSE PRACTITIONER

## 2024-04-11 PROCEDURE — 1157F ADVNC CARE PLAN IN RCRD: CPT | Performed by: NURSE PRACTITIONER

## 2024-04-11 PROCEDURE — 80069 RENAL FUNCTION PANEL: CPT | Performed by: NURSE PRACTITIONER

## 2024-04-11 PROCEDURE — 84443 ASSAY THYROID STIM HORMONE: CPT | Performed by: NURSE PRACTITIONER

## 2024-04-11 PROCEDURE — 3077F SYST BP >= 140 MM HG: CPT | Performed by: NURSE PRACTITIONER

## 2024-04-11 PROCEDURE — 82570 ASSAY OF URINE CREATININE: CPT | Mod: 91 | Performed by: NURSE PRACTITIONER

## 2024-04-11 PROCEDURE — 99215 OFFICE O/P EST HI 40 MIN: CPT | Performed by: NURSE PRACTITIONER

## 2024-04-11 PROCEDURE — 81001 URINALYSIS AUTO W/SCOPE: CPT | Performed by: NURSE PRACTITIONER

## 2024-04-11 PROCEDURE — 1160F RVW MEDS BY RX/DR IN RCRD: CPT | Performed by: NURSE PRACTITIONER

## 2024-04-11 PROCEDURE — 1159F MED LIST DOCD IN RCRD: CPT | Performed by: NURSE PRACTITIONER

## 2024-04-11 PROCEDURE — 84439 ASSAY OF FREE THYROXINE: CPT | Performed by: NURSE PRACTITIONER

## 2024-04-11 PROCEDURE — 83735 ASSAY OF MAGNESIUM: CPT | Performed by: NURSE PRACTITIONER

## 2024-04-11 PROCEDURE — 3078F DIAST BP <80 MM HG: CPT | Performed by: NURSE PRACTITIONER

## 2024-04-11 PROCEDURE — 82043 UR ALBUMIN QUANTITATIVE: CPT | Performed by: NURSE PRACTITIONER

## 2024-04-11 ASSESSMENT — PATIENT HEALTH QUESTIONNAIRE - PHQ9
1. LITTLE INTEREST OR PLEASURE IN DOING THINGS: NOT AT ALL
SUM OF ALL RESPONSES TO PHQ9 QUESTIONS 1 AND 2: 0
2. FEELING DOWN, DEPRESSED OR HOPELESS: NOT AT ALL

## 2024-04-11 ASSESSMENT — ENCOUNTER SYMPTOMS
OCCASIONAL FEELINGS OF UNSTEADINESS: 1
DEPRESSION: 0
LOSS OF SENSATION IN FEET: 0

## 2024-04-11 ASSESSMENT — PAIN SCALES - GENERAL: PAINLEVEL: 0-NO PAIN

## 2024-04-12 ENCOUNTER — TELEPHONE (OUTPATIENT)
Dept: GERIATRIC MEDICINE | Facility: CLINIC | Age: 69
End: 2024-04-12
Payer: MEDICARE

## 2024-04-12 DIAGNOSIS — E03.9 HYPOTHYROIDISM, UNSPECIFIED TYPE: Primary | ICD-10-CM

## 2024-04-12 RX ORDER — LEVOTHYROXINE SODIUM 125 UG/1
125 TABLET ORAL DAILY
Qty: 30 TABLET | Refills: 11 | Status: SHIPPED | OUTPATIENT
Start: 2024-04-12 | End: 2024-04-12

## 2024-04-12 RX ORDER — LEVOTHYROXINE SODIUM 125 UG/1
125 TABLET ORAL DAILY
Qty: 30 TABLET | Refills: 11 | Status: SHIPPED | OUTPATIENT
Start: 2024-04-12 | End: 2025-04-12

## 2024-04-12 NOTE — RESULT ENCOUNTER NOTE
TSH; Monitoring BP at Home  1. TSH: Please contact patient and inform her that her TSH level is still too low, 0.08. Her levothyroxine will need to be decreased again. This time from 137 mcg every AM to 125 mcg every AM.  Please remind her she needs to take the levothyroxine on an empty stomach without any other medications or vitamins.  She will need to repeat the blood work in 6 weeks.  Let her know I will put an order for the labs in the system. Ask her to put a reminder on her calendar to have the labs done around May 23.    2: Monitoring BP at Home: Instructed patient to keep track of her blood pressure for the next week.  Keep a record.  Take blood pressure first thing in the morning.  Use the bathroom.  Wear similar clothing every morning.  Take blood pressure while sitting with feet flat on the floor.  Take before drinking any caffeinated beverages and before taking any medications.  Call office next Friday and report blood pressures to nurse.    Thank you.

## 2024-04-13 LAB
ALBUMIN SERPL BCP-MCNC: 4.4 G/DL (ref 3.4–5)
ANION GAP SERPL CALC-SCNC: 17 MMOL/L (ref 10–20)
BUN SERPL-MCNC: 27 MG/DL (ref 6–23)
CALCIUM SERPL-MCNC: 9.9 MG/DL (ref 8.6–10.6)
CHLORIDE SERPL-SCNC: 106 MMOL/L (ref 98–107)
CO2 SERPL-SCNC: 24 MMOL/L (ref 21–32)
CREAT SERPL-MCNC: 1.8 MG/DL (ref 0.5–1.05)
EGFRCR SERPLBLD CKD-EPI 2021: 30 ML/MIN/1.73M*2
GLUCOSE SERPL-MCNC: 96 MG/DL (ref 74–99)
MAGNESIUM SERPL-MCNC: 2.18 MG/DL (ref 1.6–2.4)
PHOSPHATE SERPL-MCNC: 3.8 MG/DL (ref 2.5–4.9)
POTASSIUM SERPL-SCNC: 4 MMOL/L (ref 3.5–5.3)
SODIUM SERPL-SCNC: 143 MMOL/L (ref 136–145)

## 2024-04-15 ENCOUNTER — TELEMEDICINE (OUTPATIENT)
Dept: NEPHROLOGY | Facility: CLINIC | Age: 69
End: 2024-04-15
Payer: MEDICARE

## 2024-04-15 DIAGNOSIS — N18.32 HYPERTENSIVE KIDNEY DISEASE WITH STAGE 3B CHRONIC KIDNEY DISEASE (MULTI): Primary | ICD-10-CM

## 2024-04-15 DIAGNOSIS — I12.9 HYPERTENSIVE KIDNEY DISEASE WITH STAGE 3B CHRONIC KIDNEY DISEASE (MULTI): Primary | ICD-10-CM

## 2024-04-15 PROCEDURE — 99213 OFFICE O/P EST LOW 20 MIN: CPT | Performed by: INTERNAL MEDICINE

## 2024-04-15 PROCEDURE — 1160F RVW MEDS BY RX/DR IN RCRD: CPT | Performed by: INTERNAL MEDICINE

## 2024-04-15 PROCEDURE — 1157F ADVNC CARE PLAN IN RCRD: CPT | Performed by: INTERNAL MEDICINE

## 2024-04-15 PROCEDURE — 1159F MED LIST DOCD IN RCRD: CPT | Performed by: INTERNAL MEDICINE

## 2024-04-15 RX ORDER — CARVEDILOL 6.25 MG/1
6.25 TABLET ORAL 2 TIMES DAILY
Qty: 60 TABLET | Refills: 3 | Status: SHIPPED | OUTPATIENT
Start: 2024-04-15 | End: 2025-04-15

## 2024-04-15 NOTE — PROGRESS NOTES
An interactive audio and video telecommunication system which permits real time communications between the patient (at the originating site) and provider (at the distant site) was utilized to provide this telehealth service.  Verbal consent was requested and obtained from patient on this date for a telehealth visit.    No new complaints  No recent hospitalizations  Denies nausea, vomiting, chest pain, dyspnea  No urinary symptoms     Home Blood pressures 156-177/  Weight stable  NAD  Sclera AI s inj  MMM no sores  No tremor  Appropriate  No edema    Stage 3b CKD , stable  Mild proteinuria  HTN not well controlled    SGLT2 risk / benefit / indications  Labs and follow up in 3 months  Pharm referral for SGLT2, BP med uptitration  Carvedilol 6.25 BID

## 2024-04-18 NOTE — PROGRESS NOTES
Subjective   Patient ID: Cleo Corral is a 69 y.o. female who presents for CKD    Referring Provider: Isi Pruitt    HPI  HPI: CKD stage 3b/a2 last EGFR 30 sCr 1.8 UACR 114.8   HTN: uncontrolled  BP: 173/102 at last ov. home readings 155-165/80  Medications  Carvedilol 6.25mg BID (started 2 days ago)  Lisinopril 40mg every day    glucose: well controlled and monitored  A1C 5.6    HLD:    On statin?     Medications reviewed for appropriate dosing in setting of CKD  Recommended changes:  - no adjustments needed at this time    Objective     There were no vitals taken for this visit.     Labs  Lab Results   Component Value Date    BILITOT 0.5 03/31/2021    CALCIUM 9.9 04/11/2024    CO2 24 04/11/2024     04/11/2024    CREATININE 1.80 (H) 04/11/2024    GLUCOSE 96 04/11/2024    ALKPHOS 64 03/31/2021    K 4.0 04/11/2024    PROT 7.4 07/12/2021     04/11/2024    AST 25 03/31/2021    ALT 30 03/31/2021    BUN 27 (H) 04/11/2024    ANIONGAP 17 04/11/2024    MG 2.18 04/11/2024    PHOS 3.8 04/11/2024    ALBUMIN 4.4 04/11/2024    GFRF 39 (A) 04/28/2023     Lab Results   Component Value Date    TRIG 274 (H) 02/01/2023    CHOL 200 (H) 02/01/2023    HDL 41.3 02/01/2023     Lab Results   Component Value Date    HGBA1C 5.6 02/01/2023       Current Outpatient Medications on File Prior to Visit   Medication Sig Dispense Refill    amLODIPine (Norvasc) 10 mg tablet once every 24 hours.      carvedilol (Coreg) 6.25 mg tablet Take 1 tablet (6.25 mg) by mouth 2 times a day. 60 tablet 3    cholecalciferol (Vitamin D-3) 50 mcg (2,000 unit) capsule TAKE 1 CAPSULE BY MOUTH EVERY DAY 90 capsule 1    famotidine (Pepcid) 20 mg tablet TAKE 1 TABLET BY MOUTH EVERY DAY AS DIRECTED (Patient not taking: Reported on 4/11/2024) 90 tablet 1    fluticasone (Flonase) 50 mcg/actuation nasal spray Administer into affected nostril(s).      imatinib (Gleevec) 400 mg tablet Take 1 tablet (400 mg total) by mouth once every 24 hours.       levothyroxine (Synthroid, Levoxyl) 125 mcg tablet Take 1 tablet (125 mcg) by mouth once daily. 30 tablet 11    lisinopril 40 mg tablet Take 1 tablet (40 mg) by mouth once daily. 90 tablet 1    potassium chloride CR 10 mEq ER tablet Take 5 tablets (50 mEq) by mouth once daily.      [DISCONTINUED] levothyroxine (Synthroid, Levoxyl) 125 mcg tablet Take 1 tablet (125 mcg) by mouth once daily. 30 tablet 11    [DISCONTINUED] levothyroxine (Synthroid, Levoxyl) 137 mcg tablet Take 1 tablet (137 mcg) by mouth once daily. 90 tablet 3     No current facility-administered medications on file prior to visit.        Assessment/Plan   PATIENT EDUCATION/DISCUSSION:  - Counseled patient on MOA, expectations, side effects, duration of therapy, contraindications, administration, and monitoring parameters  - Answered all patient questions and concerns  - 547 for first fill. None covered, 545 deduct. May be eligible for patient assistance. Sent email with pap info.  _______________________________________________________________________  PLAN  1. START Farxiga 5mg qd  2. Prescription sent to [facility] pharmacy for assistance on authorization and copay. Medication will be mailed to patient.    Duy Sutton, PharmD    Continue all meds under the continuation of care with the referring provider and clinical pharmacy team.

## 2024-04-19 ENCOUNTER — TELEMEDICINE (OUTPATIENT)
Dept: PHARMACY | Facility: HOSPITAL | Age: 69
End: 2024-04-19
Payer: MEDICARE

## 2024-04-19 DIAGNOSIS — I12.9 HYPERTENSIVE KIDNEY DISEASE WITH STAGE 3B CHRONIC KIDNEY DISEASE (MULTI): ICD-10-CM

## 2024-04-19 DIAGNOSIS — N18.32 HYPERTENSIVE KIDNEY DISEASE WITH STAGE 3B CHRONIC KIDNEY DISEASE (MULTI): ICD-10-CM

## 2024-04-19 RX ORDER — DAPAGLIFLOZIN 5 MG/1
5 TABLET, FILM COATED ORAL DAILY
Qty: 90 TABLET | Refills: 0 | Status: SHIPPED | OUTPATIENT
Start: 2024-04-19 | End: 2024-07-24

## 2024-04-22 ENCOUNTER — SPECIALTY PHARMACY (OUTPATIENT)
Dept: PHARMACY | Facility: CLINIC | Age: 69
End: 2024-04-22

## 2024-04-23 PROCEDURE — RXMED WILLOW AMBULATORY MEDICATION CHARGE

## 2024-04-25 ENCOUNTER — PHARMACY VISIT (OUTPATIENT)
Dept: PHARMACY | Facility: CLINIC | Age: 69
End: 2024-04-25
Payer: COMMERCIAL

## 2024-04-25 PROBLEM — N18.32 HYPERTENSIVE KIDNEY DISEASE WITH STAGE 3B CHRONIC KIDNEY DISEASE (MULTI): Status: ACTIVE | Noted: 2024-04-25

## 2024-04-25 PROBLEM — I12.9 HYPERTENSIVE KIDNEY DISEASE WITH STAGE 3B CHRONIC KIDNEY DISEASE (MULTI): Status: ACTIVE | Noted: 2024-04-25

## 2024-04-25 NOTE — ASSESSMENT & PLAN NOTE
Blood pressure elevated 173/102. Taken a second time was 171/70. Patient reports she was worried about  who was home alone. She reports taking medications as prescribed. Continues to take carvedilol and lisinopril, but no longer takes amlodipine.

## 2024-04-26 ENCOUNTER — TELEPHONE (OUTPATIENT)
Dept: GERIATRIC MEDICINE | Facility: CLINIC | Age: 69
End: 2024-04-26

## 2024-05-04 DIAGNOSIS — E55.9 VITAMIN D DEFICIENCY: ICD-10-CM

## 2024-05-06 RX ORDER — ACETAMINOPHEN 500 MG
TABLET ORAL DAILY
Qty: 90 CAPSULE | Refills: 1 | Status: SHIPPED | OUTPATIENT
Start: 2024-05-06

## 2024-05-20 ENCOUNTER — TELEMEDICINE (OUTPATIENT)
Dept: PHARMACY | Facility: HOSPITAL | Age: 69
End: 2024-05-20
Payer: MEDICARE

## 2024-05-20 DIAGNOSIS — N18.32 HYPERTENSIVE KIDNEY DISEASE WITH STAGE 3B CHRONIC KIDNEY DISEASE (MULTI): Primary | ICD-10-CM

## 2024-05-20 DIAGNOSIS — I12.9 HYPERTENSIVE KIDNEY DISEASE WITH STAGE 3B CHRONIC KIDNEY DISEASE (MULTI): Primary | ICD-10-CM

## 2024-05-20 NOTE — PROGRESS NOTES
Subjective   Patient ID: Cleo Corral is a 69 y.o. female who presents for CKD    Referring Provider: Isi RODRIGUEZ  HPI: CKD stage 3b/a2 last EGFR 30 sCr 1.8 UACR 114.8   Now 1 month on farxiga. Reports tolerating well. No issues with dizziness/lightheadedness. No issues with painful/difficult urination. Agreeable to increase to target dose of 10mg. Will use of 5mg tablets to do so.  HTN: uncontrolled  BP: 173/102 at last ov. home readings improving but still elevated 133-158/80 will follow up on 6/21  Medications  Carvedilol 6.25mg BID   Lisinopril 40mg every day    glucose: well controlled and monitored  A1C 5.6    HLD:    On statin?     Medications reviewed for appropriate dosing in setting of CKD  Recommended changes:  - no adjustments needed at this time    Objective     There were no vitals taken for this visit.     Labs  Lab Results   Component Value Date    BILITOT 0.5 03/31/2021    CALCIUM 9.9 04/11/2024    CO2 24 04/11/2024     04/11/2024    CREATININE 1.80 (H) 04/11/2024    GLUCOSE 96 04/11/2024    ALKPHOS 64 03/31/2021    K 4.0 04/11/2024    PROT 7.4 07/12/2021     04/11/2024    AST 25 03/31/2021    ALT 30 03/31/2021    BUN 27 (H) 04/11/2024    ANIONGAP 17 04/11/2024    MG 2.18 04/11/2024    PHOS 3.8 04/11/2024    ALBUMIN 4.4 04/11/2024    GFRF 39 (A) 04/28/2023     Lab Results   Component Value Date    TRIG 274 (H) 02/01/2023    CHOL 200 (H) 02/01/2023    HDL 41.3 02/01/2023     Lab Results   Component Value Date    HGBA1C 5.6 02/01/2023       Current Outpatient Medications on File Prior to Visit   Medication Sig Dispense Refill    carvedilol (Coreg) 6.25 mg tablet Take 1 tablet (6.25 mg) by mouth 2 times a day. 60 tablet 3    cholecalciferol (Vitamin D-3) 50 mcg (2,000 unit) capsule TAKE 1 CAPSULE BY MOUTH EVERY DAY 90 capsule 1    famotidine (Pepcid) 20 mg tablet TAKE 1 TABLET BY MOUTH EVERY DAY AS DIRECTED (Patient not taking: Reported on 4/11/2024) 90 tablet 1     Farxiga 5 mg Take 1 tablet (5 mg) by mouth once daily. TAKE ONE TABLET BY MOUTH ONCE DAILY IN THE MORNING 90 tablet 0    imatinib (Gleevec) 400 mg tablet Take 1 tablet (400 mg total) by mouth once every 24 hours.      levothyroxine (Synthroid, Levoxyl) 125 mcg tablet Take 1 tablet (125 mcg) by mouth once daily. 30 tablet 11    lisinopril 40 mg tablet Take 1 tablet (40 mg) by mouth once daily. 90 tablet 1    potassium chloride CR 10 mEq ER tablet Take 5 tablets (50 mEq) by mouth once daily.       No current facility-administered medications on file prior to visit.        Assessment/Plan   PATIENT EDUCATION/DISCUSSION:  - Counseled patient on MOA, expectations, side effects, duration of therapy, contraindications, administration, and monitoring parameters  - Answered all patient questions and concerns  - 547 for first fill. None covered, 545 deduct.  -enrolled in  patient assistance mik 4/23/25  - follow up on home bps 6/21 @11a  _______________________________________________________________________  PLAN  1. INCREASE Farxiga 10mg qd  2. Prescription sent to  [facility] pharmacy for assistance on authorization and copay. Medication will be mailed to patient.    Duy Sutton, PharmD    Continue all meds under the continuation of care with the referring provider and clinical pharmacy team.

## 2024-06-21 ENCOUNTER — APPOINTMENT (OUTPATIENT)
Dept: PHARMACY | Facility: HOSPITAL | Age: 69
End: 2024-06-21
Payer: MEDICARE

## 2024-06-21 DIAGNOSIS — N18.32 HYPERTENSIVE KIDNEY DISEASE WITH STAGE 3B CHRONIC KIDNEY DISEASE (MULTI): ICD-10-CM

## 2024-06-21 DIAGNOSIS — I12.9 HYPERTENSIVE KIDNEY DISEASE WITH STAGE 3B CHRONIC KIDNEY DISEASE (MULTI): ICD-10-CM

## 2024-06-21 PROCEDURE — RXMED WILLOW AMBULATORY MEDICATION CHARGE

## 2024-06-21 RX ORDER — CARVEDILOL 12.5 MG/1
12.5 TABLET ORAL 2 TIMES DAILY
Qty: 180 TABLET | Refills: 3 | Status: SHIPPED | OUTPATIENT
Start: 2024-06-21 | End: 2025-06-21

## 2024-06-21 RX ORDER — DAPAGLIFLOZIN 10 MG/1
10 TABLET, FILM COATED ORAL DAILY
Qty: 90 TABLET | Refills: 3 | Status: SHIPPED | OUTPATIENT
Start: 2024-06-21 | End: 2025-06-16

## 2024-06-21 NOTE — PROGRESS NOTES
Subjective   Patient ID: Cleo Corral is a 69 y.o. female who presents for CKD    Referring Provider: Isi RODRIGUEZ  HPI: CKD stage 3b/a2 last EGFR 30 sCr 1.8 UACR 114.8   Now 1 month on farxiga. Reports tolerating well. No issues with dizziness/lightheadedness. No issues with painful/difficult urination. Agreeable to increase to target dose of 10mg. Will use of 5mg tablets to do so.  HTN: uncontrolled  BP: 173/102 at last ov. home readings improving but still elevated 133-158/80 will follow up on 6/21  130-154/66-76  Medications  Carvedilol 12.5mg BID   Lisinopril 40mg every day    glucose: well controlled and monitored  A1C 5.6    HLD:    On statin? no    Medications reviewed for appropriate dosing in setting of CKD  Recommended changes:  - no adjustments needed at this time    Objective     There were no vitals taken for this visit.     Labs  Lab Results   Component Value Date    BILITOT 0.5 03/31/2021    CALCIUM 9.9 04/11/2024    CO2 24 04/11/2024     04/11/2024    CREATININE 1.80 (H) 04/11/2024    GLUCOSE 96 04/11/2024    ALKPHOS 64 03/31/2021    K 4.0 04/11/2024    PROT 7.4 07/12/2021     04/11/2024    AST 25 03/31/2021    ALT 30 03/31/2021    BUN 27 (H) 04/11/2024    ANIONGAP 17 04/11/2024    MG 2.18 04/11/2024    PHOS 3.8 04/11/2024    ALBUMIN 4.4 04/11/2024    GFRF 39 (A) 04/28/2023     Lab Results   Component Value Date    TRIG 274 (H) 02/01/2023    CHOL 200 (H) 02/01/2023    HDL 41.3 02/01/2023     Lab Results   Component Value Date    HGBA1C 5.6 02/01/2023       Current Outpatient Medications on File Prior to Visit   Medication Sig Dispense Refill    carvedilol (Coreg) 6.25 mg tablet Take 1 tablet (6.25 mg) by mouth 2 times a day. 60 tablet 3    cholecalciferol (Vitamin D-3) 50 mcg (2,000 unit) capsule TAKE 1 CAPSULE BY MOUTH EVERY DAY 90 capsule 1    famotidine (Pepcid) 20 mg tablet TAKE 1 TABLET BY MOUTH EVERY DAY AS DIRECTED (Patient not taking: Reported on 4/11/2024) 90  tablet 1    Farxiga 5 mg Take 1 tablet (5 mg) by mouth once daily. TAKE ONE TABLET BY MOUTH ONCE DAILY IN THE MORNING 90 tablet 0    imatinib (Gleevec) 400 mg tablet Take 1 tablet (400 mg total) by mouth once every 24 hours.      levothyroxine (Synthroid, Levoxyl) 125 mcg tablet Take 1 tablet (125 mcg) by mouth once daily. 30 tablet 11    lisinopril 40 mg tablet Take 1 tablet (40 mg) by mouth once daily. 90 tablet 1    potassium chloride CR 10 mEq ER tablet Take 5 tablets (50 mEq) by mouth once daily.       No current facility-administered medications on file prior to visit.        Assessment/Plan   PATIENT EDUCATION/DISCUSSION:  - Counseled patient on MOA, expectations, side effects, duration of therapy, contraindications, administration, and monitoring parameters  - Answered all patient questions and concerns  - 547 for first fill. None covered, 545 deduct.  - enrolled in  patient assistance mik 4/23/25  - follow up on home [facility] 7/26 @10:30  _______________________________________________________________________  PLAN  1. CONTINUE Farxiga 10mg every day  2. INCREASE carvedilol 12.5mg bid  3. Prescription sent to [facility] pharmacy for assistance on authorization and copay. Medication will be mailed to patient.    Duy Sutton, PharmD    Continue all meds under the continuation of care with the referring provider and clinical pharmacy team.

## 2024-06-25 ENCOUNTER — PHARMACY VISIT (OUTPATIENT)
Dept: PHARMACY | Facility: CLINIC | Age: 69
End: 2024-06-25
Payer: COMMERCIAL

## 2024-07-15 ENCOUNTER — LAB (OUTPATIENT)
Dept: LAB | Facility: LAB | Age: 69
End: 2024-07-15
Payer: MEDICARE

## 2024-07-15 DIAGNOSIS — I12.9 HYPERTENSIVE KIDNEY DISEASE WITH STAGE 3B CHRONIC KIDNEY DISEASE (MULTI): ICD-10-CM

## 2024-07-15 DIAGNOSIS — N18.32 HYPERTENSIVE KIDNEY DISEASE WITH STAGE 3B CHRONIC KIDNEY DISEASE (MULTI): ICD-10-CM

## 2024-07-15 LAB
25(OH)D3 SERPL-MCNC: 34 NG/ML (ref 31–100)
ALBUMIN SERPL-MCNC: 4.3 G/DL (ref 3.5–5)
ANION GAP SERPL CALC-SCNC: 12 MMOL/L
APPEARANCE UR: CLEAR
BILIRUB UR STRIP.AUTO-MCNC: NEGATIVE MG/DL
BUN SERPL-MCNC: 19 MG/DL (ref 8–25)
CALCIUM SERPL-MCNC: 9.1 MG/DL (ref 8.5–10.4)
CHLORIDE SERPL-SCNC: 104 MMOL/L (ref 97–107)
CO2 SERPL-SCNC: 25 MMOL/L (ref 24–31)
COLOR UR: ABNORMAL
CREAT SERPL-MCNC: 1.8 MG/DL (ref 0.4–1.6)
CREAT UR-MCNC: 81.9 MG/DL
CREAT UR-MCNC: 81.9 MG/DL
EGFRCR SERPLBLD CKD-EPI 2021: 30 ML/MIN/1.73M*2
ERYTHROCYTE [DISTWIDTH] IN BLOOD BY AUTOMATED COUNT: 14.8 % (ref 11.5–14.5)
GLUCOSE SERPL-MCNC: 102 MG/DL (ref 65–99)
GLUCOSE UR STRIP.AUTO-MCNC: ABNORMAL MG/DL
HCT VFR BLD AUTO: 37.2 % (ref 36–46)
HGB BLD-MCNC: 12.3 G/DL (ref 12–16)
KETONES UR STRIP.AUTO-MCNC: NEGATIVE MG/DL
LEUKOCYTE ESTERASE UR QL STRIP.AUTO: ABNORMAL
MCH RBC QN AUTO: 31.5 PG (ref 26–34)
MCHC RBC AUTO-ENTMCNC: 33.1 G/DL (ref 32–36)
MCV RBC AUTO: 95 FL (ref 80–100)
MICROALBUMIN UR-MCNC: 53 MG/L (ref 0–23)
MICROALBUMIN/CREAT UR: 64.7 UG/MG CREAT
NITRITE UR QL STRIP.AUTO: NEGATIVE
NRBC BLD-RTO: 0 /100 WBCS (ref 0–0)
PH UR STRIP.AUTO: 6 [PH]
PHOSPHATE SERPL-MCNC: 3.5 MG/DL (ref 2.5–4.5)
PLATELET # BLD AUTO: 203 X10*3/UL (ref 150–450)
POTASSIUM SERPL-SCNC: 4.5 MMOL/L (ref 3.4–5.1)
PROT UR STRIP.AUTO-MCNC: ABNORMAL MG/DL
PROT UR-MCNC: 24 MG/DL
PROT/CREAT UR: 0.29 MG/MG CREAT
PTH-INTACT SERPL-MCNC: 133.3 PG/ML (ref 18.5–88)
RBC # BLD AUTO: 3.9 X10*6/UL (ref 4–5.2)
RBC # UR STRIP.AUTO: NEGATIVE /UL
RBC #/AREA URNS AUTO: ABNORMAL /HPF
SODIUM SERPL-SCNC: 141 MMOL/L (ref 133–145)
SP GR UR STRIP.AUTO: 1.01
UROBILINOGEN UR STRIP.AUTO-MCNC: NORMAL MG/DL
WBC # BLD AUTO: 6.8 X10*3/UL (ref 4.4–11.3)
WBC #/AREA URNS AUTO: ABNORMAL /HPF

## 2024-07-15 PROCEDURE — 82570 ASSAY OF URINE CREATININE: CPT

## 2024-07-15 PROCEDURE — 83970 ASSAY OF PARATHORMONE: CPT

## 2024-07-15 PROCEDURE — 36415 COLL VENOUS BLD VENIPUNCTURE: CPT

## 2024-07-15 PROCEDURE — 82306 VITAMIN D 25 HYDROXY: CPT

## 2024-07-15 PROCEDURE — 82043 UR ALBUMIN QUANTITATIVE: CPT

## 2024-07-15 PROCEDURE — 85027 COMPLETE CBC AUTOMATED: CPT

## 2024-07-15 PROCEDURE — 81001 URINALYSIS AUTO W/SCOPE: CPT

## 2024-07-15 PROCEDURE — 84156 ASSAY OF PROTEIN URINE: CPT

## 2024-07-15 PROCEDURE — 80069 RENAL FUNCTION PANEL: CPT

## 2024-07-18 DIAGNOSIS — I12.9 HYPERTENSIVE KIDNEY DISEASE WITH STAGE 3B CHRONIC KIDNEY DISEASE (MULTI): ICD-10-CM

## 2024-07-18 DIAGNOSIS — N18.32 HYPERTENSIVE KIDNEY DISEASE WITH STAGE 3B CHRONIC KIDNEY DISEASE (MULTI): ICD-10-CM

## 2024-07-21 RX ORDER — LISINOPRIL 40 MG/1
40 TABLET ORAL DAILY
Qty: 90 TABLET | Refills: 3 | Status: SHIPPED | OUTPATIENT
Start: 2024-07-21

## 2024-07-22 ENCOUNTER — APPOINTMENT (OUTPATIENT)
Dept: NEPHROLOGY | Facility: CLINIC | Age: 69
End: 2024-07-22
Payer: MEDICARE

## 2024-07-22 DIAGNOSIS — N18.32 HYPERTENSIVE KIDNEY DISEASE WITH STAGE 3B CHRONIC KIDNEY DISEASE (MULTI): Primary | ICD-10-CM

## 2024-07-22 DIAGNOSIS — I12.9 HYPERTENSIVE KIDNEY DISEASE WITH STAGE 3B CHRONIC KIDNEY DISEASE (MULTI): Primary | ICD-10-CM

## 2024-07-22 PROCEDURE — 1157F ADVNC CARE PLAN IN RCRD: CPT | Performed by: INTERNAL MEDICINE

## 2024-07-22 PROCEDURE — 1160F RVW MEDS BY RX/DR IN RCRD: CPT | Performed by: INTERNAL MEDICINE

## 2024-07-22 PROCEDURE — 99213 OFFICE O/P EST LOW 20 MIN: CPT | Performed by: INTERNAL MEDICINE

## 2024-07-22 PROCEDURE — 1159F MED LIST DOCD IN RCRD: CPT | Performed by: INTERNAL MEDICINE

## 2024-07-22 NOTE — PROGRESS NOTES
An interactive audio and video telecommunication system which permits real time communications between the patient (at the originating site) and provider (at the distant site) was utilized to provide this telehealth service.  Verbal consent was requested and obtained from patient on this date for a telehealth visit.    No new complaints  No recent hospitalizations  Denies nausea, vomiting, chest pain, dyspnea  No urinary symptoms     Home Blood pressures 140-177/  Weight stable  NAD  Sclera AI s inj  MMM no sores  No tremor  Appropriate  No edema    Stage 3b CKD , stable  Mild proteinuria improved  HTN not well controlled    Record home heart rates with blood pressures  Consider increase in carvedilol at appt with pharm at the end of the week  Labs and follow up in 3 months

## 2024-07-26 ENCOUNTER — APPOINTMENT (OUTPATIENT)
Dept: PHARMACY | Facility: HOSPITAL | Age: 69
End: 2024-07-26
Payer: MEDICARE

## 2024-07-26 DIAGNOSIS — N18.32 HYPERTENSIVE KIDNEY DISEASE WITH STAGE 3B CHRONIC KIDNEY DISEASE (MULTI): ICD-10-CM

## 2024-07-26 DIAGNOSIS — I12.9 HYPERTENSIVE KIDNEY DISEASE WITH STAGE 3B CHRONIC KIDNEY DISEASE (MULTI): ICD-10-CM

## 2024-07-26 RX ORDER — CARVEDILOL 25 MG/1
25 TABLET ORAL 2 TIMES DAILY
Qty: 180 TABLET | Refills: 3 | Status: SHIPPED | OUTPATIENT
Start: 2024-07-26 | End: 2025-07-26

## 2024-07-26 NOTE — PROGRESS NOTES
Subjective   Patient ID: Cleo Corral is a 69 y.o. female who presents for CKD    Referring Provider: Isi RODRIGUEZ  HPI: CKD stage 3b/a2 last EGFR 30 sCr 1.8 UACR 114.8   Continues tolerating the farxiga well. No issues with painful difficult urinatino no issues with dizziness/lightheadedness.  HTN: uncontrolled  BP: 173/102 at last ov.  140-177/80s HR 60s  Medications  Carvedilol 12.5mg BID   Lisinopril 40mg every day    glucose: well controlled and monitored  A1C 5.6    HLD:    On statin? no    Medications reviewed for appropriate dosing in setting of CKD  Recommended changes:  - no adjustments needed at this time    Objective     There were no vitals taken for this visit.     Labs  Lab Results   Component Value Date    BILITOT 0.5 03/31/2021    CALCIUM 9.1 07/15/2024    CO2 25 07/15/2024     07/15/2024    CREATININE 1.80 (H) 07/15/2024    GLUCOSE 102 (H) 07/15/2024    ALKPHOS 64 03/31/2021    K 4.5 07/15/2024    PROT 7.4 07/12/2021     07/15/2024    AST 25 03/31/2021    ALT 30 03/31/2021    BUN 19 07/15/2024    ANIONGAP 12 07/15/2024    MG 2.18 04/11/2024    PHOS 3.5 07/15/2024    ALBUMIN 4.3 07/15/2024    GFRF 39 (A) 04/28/2023     Lab Results   Component Value Date    TRIG 274 (H) 02/01/2023    CHOL 200 (H) 02/01/2023    HDL 41.3 02/01/2023     Lab Results   Component Value Date    HGBA1C 5.6 02/01/2023       Current Outpatient Medications on File Prior to Visit   Medication Sig Dispense Refill    carvedilol (Coreg) 12.5 mg tablet Take 1 tablet (12.5 mg) by mouth 2 times a day. 180 tablet 3    cholecalciferol (Vitamin D-3) 50 mcg (2,000 unit) capsule TAKE 1 CAPSULE BY MOUTH EVERY DAY 90 capsule 1    famotidine (Pepcid) 20 mg tablet TAKE 1 TABLET BY MOUTH EVERY DAY AS DIRECTED (Patient not taking: Reported on 4/11/2024) 90 tablet 1    Farxiga 10 mg Take 1 tablet (10 mg) by mouth once daily. TAKE ONE TABLET BY MOUTH ONCE DAILY IN THE MORNING 90 tablet 3    imatinib (Gleevec) 400 mg  tablet Take 1 tablet (400 mg total) by mouth once every 24 hours.      levothyroxine (Synthroid, Levoxyl) 125 mcg tablet Take 1 tablet (125 mcg) by mouth once daily. 30 tablet 11    lisinopril 40 mg tablet TAKE 1 TABLET ONCE DAILY 90 tablet 3    potassium chloride CR 10 mEq ER tablet Take 5 tablets (50 mEq) by mouth once daily.      [DISCONTINUED] lisinopril 40 mg tablet Take 1 tablet (40 mg) by mouth once daily. 90 tablet 1     No current facility-administered medications on file prior to visit.        Assessment/Plan   PATIENT EDUCATION/DISCUSSION:  - Counseled patient on MOA, expectations, side effects, duration of therapy, contraindications, administration, and monitoring parameters  - Answered all patient questions and concerns  - 547 for first fill. None covered, 545 deduct.  - enrolled in  patient assistance mik 25  - home bps still elevated hr is normal agreed to increase in carvedilol to 25mg bid. Advised patient to monitor for low heart rate symptoms (fatigue, , dizziness) and call if experiencing.  - follow up on home bps  @10:30  _______________________________________________________________________  PLAN  1. CONTINUE Farxiga 10mg every day  2. INCREASE carvedilol 25mg bid  3. Prescription sent to [facility] pharmacy for assistance on authorization and copay. Medication will be mailed to patient.    Duy Sutton, PharmD    Continue all meds under the continuation of care with the referring provider and clinical pharmacy team.

## 2024-08-23 ENCOUNTER — APPOINTMENT (OUTPATIENT)
Dept: PHARMACY | Facility: HOSPITAL | Age: 69
End: 2024-08-23
Payer: MEDICARE

## 2024-08-23 DIAGNOSIS — I12.9 HYPERTENSIVE KIDNEY DISEASE WITH STAGE 3B CHRONIC KIDNEY DISEASE (MULTI): Primary | ICD-10-CM

## 2024-08-23 DIAGNOSIS — N18.32 HYPERTENSIVE KIDNEY DISEASE WITH STAGE 3B CHRONIC KIDNEY DISEASE (MULTI): Primary | ICD-10-CM

## 2024-08-23 NOTE — PROGRESS NOTES
Subjective   Patient ID: Cleo Corral is a 69 y.o. female who presents for CKD    Referring Provider: Isi RODRIGUEZ  HPI: CKD stage 3b/a2 last EGFR 30 sCr 1.8 UACR 114.8   Continues tolerating the farxiga well. Was having some dizziness issues with carvedilol 25mg bid so patient self decreased to 12.5mg q am and 25mg qpm. This has been tolerable for her. Patient open to retrial of 25mg bid.  HTN: uncontrolled  BP: 173/102 at last ov.  Sys range 130-160 60s  Medications  Carvedilol 12.5mg qam 25 qpm  Lisinopril 40mg every day    glucose: well controlled and monitored  A1C 5.6    HLD:    On statin? no    Medications reviewed for appropriate dosing in setting of CKD  Recommended changes:  - no adjustments needed at this time    Objective     There were no vitals taken for this visit.     Labs  Lab Results   Component Value Date    BILITOT 0.5 03/31/2021    CALCIUM 9.1 07/15/2024    CO2 25 07/15/2024     07/15/2024    CREATININE 1.80 (H) 07/15/2024    GLUCOSE 102 (H) 07/15/2024    ALKPHOS 64 03/31/2021    K 4.5 07/15/2024    PROT 7.4 07/12/2021     07/15/2024    AST 25 03/31/2021    ALT 30 03/31/2021    BUN 19 07/15/2024    ANIONGAP 12 07/15/2024    MG 2.18 04/11/2024    PHOS 3.5 07/15/2024    ALBUMIN 4.3 07/15/2024    GFRF 39 (A) 04/28/2023     Lab Results   Component Value Date    TRIG 274 (H) 02/01/2023    CHOL 200 (H) 02/01/2023    HDL 41.3 02/01/2023     Lab Results   Component Value Date    HGBA1C 5.6 02/01/2023       Current Outpatient Medications on File Prior to Visit   Medication Sig Dispense Refill    carvedilol (Coreg) 25 mg tablet Take 1 tablet (25 mg) by mouth 2 times a day. 180 tablet 3    cholecalciferol (Vitamin D-3) 50 mcg (2,000 unit) capsule TAKE 1 CAPSULE BY MOUTH EVERY DAY 90 capsule 1    famotidine (Pepcid) 20 mg tablet TAKE 1 TABLET BY MOUTH EVERY DAY AS DIRECTED (Patient not taking: Reported on 4/11/2024) 90 tablet 1    Farxiga 10 mg Take 1 tablet (10 mg) by mouth  once daily. TAKE ONE TABLET BY MOUTH ONCE DAILY IN THE MORNING 90 tablet 3    imatinib (Gleevec) 400 mg tablet Take 1 tablet (400 mg total) by mouth once every 24 hours.      levothyroxine (Synthroid, Levoxyl) 125 mcg tablet Take 1 tablet (125 mcg) by mouth once daily. 30 tablet 11    lisinopril 40 mg tablet TAKE 1 TABLET ONCE DAILY 90 tablet 3    potassium chloride CR 10 mEq ER tablet Take 5 tablets (50 mEq) by mouth once daily.       No current facility-administered medications on file prior to visit.        Assessment/Plan   PATIENT EDUCATION/DISCUSSION:  - Counseled patient on MOA, expectations, side effects, duration of therapy, contraindications, administration, and monitoring parameters  - Answered all patient questions and concerns  - 547 for first fill. None covered, 545 deduct.  - enrolled in  patient assistance mik 25  - home bps still elevated hr is normal agreed to increase in carvedilol to 25mg bid. Advised patient to monitor for low heart rate symptoms (fatigue, , dizziness) and call if experiencing.  - follow up on home bps  @10:30  _______________________________________________________________________  PLAN  1. CONTINUE Farxiga 10mg every day  2. INCREASE carvedilol 25mg bid  3. Prescription sent to [facility] pharmacy for assistance on authorization and copay. Medication will be mailed to patient.    Duy Sutton, PharmD    Continue all meds under the continuation of care with the referring provider and clinical pharmacy team.

## 2024-09-19 PROCEDURE — RXMED WILLOW AMBULATORY MEDICATION CHARGE

## 2024-09-20 ENCOUNTER — APPOINTMENT (OUTPATIENT)
Dept: PHARMACY | Facility: HOSPITAL | Age: 69
End: 2024-09-20
Payer: MEDICARE

## 2024-09-20 ENCOUNTER — PHARMACY VISIT (OUTPATIENT)
Dept: PHARMACY | Facility: CLINIC | Age: 69
End: 2024-09-20
Payer: COMMERCIAL

## 2024-09-20 DIAGNOSIS — I12.9 HYPERTENSIVE KIDNEY DISEASE WITH STAGE 3B CHRONIC KIDNEY DISEASE (MULTI): Primary | ICD-10-CM

## 2024-09-20 DIAGNOSIS — N18.32 HYPERTENSIVE KIDNEY DISEASE WITH STAGE 3B CHRONIC KIDNEY DISEASE (MULTI): Primary | ICD-10-CM

## 2024-09-20 NOTE — PROGRESS NOTES
Subjective   Patient ID: Cleo Corral is a 69 y.o. female who presents for CKD    Referring Provider: Isi RODRIGUEZ  HPI: CKD stage 3b/a2 last EGFR 30 sCr 1.8 UACR 114.8   Continues tolerating the farxiga well. Patient is taking the carvedilol 25 mg BID and is having no issues. No dizziness or other symptoms of low BP/HR to report.    HTN:   BP: 173/102 at last ov.  Home BP readings:  -overall improving with higher dose of carvedilol. Pt states the majority of her readings the systolic BP is in the 120's-130's range.  -152/57-77  -Higher systolic in 140s-150s only 2 times in past month (takes BP every other day)  -systolic readings mostly 120s-130s  Medications  Carvedilol 25 mg bid  Lisinopril 40mg every day    glucose: well controlled and monitored  A1C 5.6  Farxiga 10 mg once daily    HLD:    On statin? no    Medications reviewed for appropriate dosing in setting of CKD  Recommended changes:  - no adjustments needed at this time    Objective     There were no vitals taken for this visit.     Labs  Lab Results   Component Value Date    BILITOT 0.5 03/31/2021    CALCIUM 9.1 07/15/2024    CO2 25 07/15/2024     07/15/2024    CREATININE 1.80 (H) 07/15/2024    GLUCOSE 102 (H) 07/15/2024    ALKPHOS 64 03/31/2021    K 4.5 07/15/2024    PROT 7.4 07/12/2021     07/15/2024    AST 25 03/31/2021    ALT 30 03/31/2021    BUN 19 07/15/2024    ANIONGAP 12 07/15/2024    MG 2.18 04/11/2024    PHOS 3.5 07/15/2024    ALBUMIN 4.3 07/15/2024    GFRF 39 (A) 04/28/2023     Lab Results   Component Value Date    TRIG 274 (H) 02/01/2023    CHOL 200 (H) 02/01/2023    HDL 41.3 02/01/2023     Lab Results   Component Value Date    HGBA1C 5.6 02/01/2023       Current Outpatient Medications on File Prior to Visit   Medication Sig Dispense Refill    carvedilol (Coreg) 25 mg tablet Take 1 tablet (25 mg) by mouth 2 times a day. 180 tablet 3    cholecalciferol (Vitamin D-3) 50 mcg (2,000 unit) capsule TAKE 1 CAPSULE  BY MOUTH EVERY DAY 90 capsule 1    famotidine (Pepcid) 20 mg tablet TAKE 1 TABLET BY MOUTH EVERY DAY AS DIRECTED (Patient not taking: Reported on 4/11/2024) 90 tablet 1    Farxiga 10 mg Take 1 tablet (10 mg) by mouth once daily. TAKE ONE TABLET BY MOUTH ONCE DAILY IN THE MORNING 90 tablet 3    imatinib (Gleevec) 400 mg tablet Take 1 tablet (400 mg total) by mouth once every 24 hours.      levothyroxine (Synthroid, Levoxyl) 125 mcg tablet Take 1 tablet (125 mcg) by mouth once daily. 30 tablet 11    lisinopril 40 mg tablet TAKE 1 TABLET ONCE DAILY 90 tablet 3    potassium chloride CR 10 mEq ER tablet Take 5 tablets (50 mEq) by mouth once daily.       No current facility-administered medications on file prior to visit.        Assessment/Plan   PATIENT EDUCATION/DISCUSSION:  - Counseled patient on MOA, expectations, side effects, duration of therapy, contraindications, administration, and monitoring parameters  - Answered all patient questions and concerns  - enrolled in  patient assistance mik 4/23/25  - home bps improving. Continue medications and checking home bps.  - will check-in on 10/18 @1030am to assess home bps and make changes as necessary  _______________________________________________________________________  PLAN  1. CONTINUE Farxiga 10mg every day and carvedilol 25 mg bid.  2.  Continue all other medications.  3. Prescription sent to [facility] pharmacy for assistance on authorization and copay. Medication will be mailed to patient.  4. Next check-in will be 10/18 @ 1030am    Keila Willis    Continue all meds under the continuation of care with the referring provider and clinical pharmacy team.

## 2024-10-16 ENCOUNTER — APPOINTMENT (OUTPATIENT)
Dept: PHARMACY | Facility: HOSPITAL | Age: 69
End: 2024-10-16
Payer: MEDICARE

## 2024-10-16 DIAGNOSIS — N18.32 HYPERTENSIVE KIDNEY DISEASE WITH STAGE 3B CHRONIC KIDNEY DISEASE (MULTI): Primary | ICD-10-CM

## 2024-10-16 DIAGNOSIS — I12.9 HYPERTENSIVE KIDNEY DISEASE WITH STAGE 3B CHRONIC KIDNEY DISEASE (MULTI): Primary | ICD-10-CM

## 2024-10-16 NOTE — PROGRESS NOTES
Subjective   Patient ID: Cleo Corral is a 69 y.o. female who presents for CKD    Referring Provider: Isi RODRIGUEZ  HPI: CKD stage 3b/a2 last EGFR 30 sCr 1.8 UACR 114.8   Continues tolerating the farxiga well. Patient is taking the carvedilol 25 mg BID and is having no issues. No dizziness or other symptoms of low BP/HR to report.    HTN:   BP: 173/102 at last ov.  Home BP readings:  -overall improving with higher dose of carvedilol. Pt states the majority of her readings the systolic BP is in the 120's-130's range.  BP   Medications  Carvedilol 25 mg bid  Lisinopril 40mg every day    glucose: well controlled and monitored  A1C 5.6  Farxiga 10 mg once daily    HLD:    On statin? no    Medications reviewed for appropriate dosing in setting of CKD  Recommended changes:  - no adjustments needed at this time    Objective     There were no vitals taken for this visit.     Labs  Lab Results   Component Value Date    BILITOT 0.5 03/31/2021    CALCIUM 9.1 07/15/2024    CO2 25 07/15/2024     07/15/2024    CREATININE 1.80 (H) 07/15/2024    GLUCOSE 102 (H) 07/15/2024    ALKPHOS 64 03/31/2021    K 4.5 07/15/2024    PROT 7.4 07/12/2021     07/15/2024    AST 25 03/31/2021    ALT 30 03/31/2021    BUN 19 07/15/2024    ANIONGAP 12 07/15/2024    MG 2.18 04/11/2024    PHOS 3.5 07/15/2024    ALBUMIN 4.3 07/15/2024    GFRF 39 (A) 04/28/2023     Lab Results   Component Value Date    TRIG 274 (H) 02/01/2023    CHOL 200 (H) 02/01/2023    HDL 41.3 02/01/2023     Lab Results   Component Value Date    HGBA1C 5.6 02/01/2023       Current Outpatient Medications on File Prior to Visit   Medication Sig Dispense Refill    carvedilol (Coreg) 25 mg tablet Take 1 tablet (25 mg) by mouth 2 times a day. 180 tablet 3    cholecalciferol (Vitamin D-3) 50 mcg (2,000 unit) capsule TAKE 1 CAPSULE BY MOUTH EVERY DAY 90 capsule 1    famotidine (Pepcid) 20 mg tablet TAKE 1 TABLET BY MOUTH EVERY DAY AS DIRECTED (Patient not taking:  Reported on 4/11/2024) 90 tablet 1    Farxiga 10 mg Take 1 tablet (10 mg) by mouth once daily. TAKE ONE TABLET BY MOUTH ONCE DAILY IN THE MORNING 90 tablet 3    imatinib (Gleevec) 400 mg tablet Take 1 tablet (400 mg total) by mouth once every 24 hours.      levothyroxine (Synthroid, Levoxyl) 125 mcg tablet Take 1 tablet (125 mcg) by mouth once daily. 30 tablet 11    lisinopril 40 mg tablet TAKE 1 TABLET ONCE DAILY 90 tablet 3    potassium chloride CR 10 mEq ER tablet Take 5 tablets (50 mEq) by mouth once daily.       No current facility-administered medications on file prior to visit.        Assessment/Plan   PATIENT EDUCATION/DISCUSSION:  - Counseled patient on MOA, expectations, side effects, duration of therapy, contraindications, administration, and monitoring parameters  - Answered all patient questions and concerns  - enrolled in  patient assistance mik 4/23/25  - home bps improving. Continue medications and checking home bps.  - will follow up in march for patient assistance renewal  _______________________________________________________________________  PLAN  1. CONTINUE Farxiga 10mg every day and carvedilol 25 mg bid.  2.  Continue all other medications.  3. Prescription sent to [facility] pharmacy for assistance on authorization and copay. Medication will be mailed to patient.  4. Next check-in will be 3/26 @ 10am    Duy Sutton, PharmD    Continue all meds under the continuation of care with the referring provider and clinical pharmacy team.

## 2024-10-18 ENCOUNTER — APPOINTMENT (OUTPATIENT)
Dept: PHARMACY | Facility: HOSPITAL | Age: 69
End: 2024-10-18
Payer: MEDICARE

## 2024-10-18 ENCOUNTER — APPOINTMENT (OUTPATIENT)
Dept: GERIATRIC MEDICINE | Facility: CLINIC | Age: 69
End: 2024-10-18
Payer: MEDICARE

## 2024-10-18 NOTE — PROGRESS NOTES
Subjective   Patient ID: Cleo Corral is a 69 y.o. female who presents for No chief complaint on file..  Cleo Corral is a 69 y.o. female who presents for geriatric follow-up visit. 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 Patient previously had telehealth visit with Taylor Alas CNP to review labs. At that time, plan was to decrease dose of levothyroxine 137mcg daily and recheck labs in 6-8 weeks. Order placed for future TSH, but patient did not know/remember to have labs done. TSH will be repeated today. Also, patient requesting to have labs done in clinic ordered by other providers: Urine, random albumin, Urine, w/ reflex, microscopic; CBC, BMP, mag, along with TSH w/ reflex to T4. She reports fairly good health. Mostly concerned about husbands health, he is a  [facility] patient. Will accommodate patient with virtual visits in future as needed.      Current Outpatient Medications on File Prior to Visit   Medication Sig Dispense Refill    carvedilol (Coreg) 25 mg tablet Take 1 tablet (25 mg) by mouth 2 times a day. 180 tablet 3    cholecalciferol (Vitamin D-3) 50 mcg (2,000 unit) capsule TAKE 1 CAPSULE BY MOUTH EVERY DAY 90 capsule 1    famotidine (Pepcid) 20 mg tablet TAKE 1 TABLET BY MOUTH EVERY DAY AS DIRECTED (Patient not taking: Reported on 4/11/2024) 90 tablet 1    Farxiga 10 mg Take 1 tablet (10 mg) by mouth once daily. TAKE ONE TABLET BY MOUTH ONCE DAILY IN THE MORNING 90 tablet 3    imatinib (Gleevec) 400 mg tablet Take 1 tablet (400 mg total) by mouth once every 24 hours.      levothyroxine (Synthroid, Levoxyl) 125 mcg tablet Take 1 tablet (125 mcg) by mouth once daily. 30 tablet 11    lisinopril 40 mg tablet TAKE 1 TABLET ONCE DAILY 90 tablet 3    potassium chloride CR 10 mEq ER tablet Take 5 tablets (50 mEq) by mouth once daily.       No current facility-administered medications on file prior to visit.         Review of Systems   Cardiovascular:  Positive for leg swelling.    All other systems reviewed and are negative.      Objective   Visit Vitals  OB Status Postmenopausal   Smoking Status Never      Latest Reference Range & Units 24 16:34 24 16:19   Thyroxine, Free 0.78 - 1.48 ng/dL 1.47 1.49 (H)   Thyroid Stimulating Hormone 0.44 - 3.98 mIU/L 0.07 (L) 0.08 (L)      Latest Reference Range & Units Most Recent   Hemoglobin A1C % 5.6  23 09:39       Physical Exam  Vitals (173/102) reviewed.   Constitutional:       General: She is awake.      Appearance: Normal appearance. She is well-groomed. She is obese.   HENT:      Head: Normocephalic and atraumatic.   Cardiovascular:      Rate and Rhythm: Normal rate and regular rhythm.      Pulses:           Radial pulses are 3+ on the right side and 3+ on the left side.      Heart sounds: Normal heart sounds.   Pulmonary:      Effort: Pulmonary effort is normal.      Breath sounds: Normal breath sounds and air entry.   Abdominal:      General: Abdomen is protuberant. Bowel sounds are normal.      Palpations: Abdomen is soft.   Musculoskeletal:      Right lower le+ Pitting Edema present.      Left lower le+ Pitting Edema present.   Neurological:      Mental Status: She is alert.   Psychiatric:         Behavior: Behavior is cooperative.         Assessment/Plan   Problem List Items Addressed This Visit    None      Follow-up in Geriatrics - 6 Months       DAREK Vyas-CNP 10/18/24 3:24 AM

## 2024-11-07 ENCOUNTER — LAB (OUTPATIENT)
Dept: LAB | Facility: LAB | Age: 69
End: 2024-11-07
Payer: MEDICARE

## 2024-11-07 DIAGNOSIS — I12.9 HYPERTENSIVE KIDNEY DISEASE WITH STAGE 3B CHRONIC KIDNEY DISEASE (MULTI): ICD-10-CM

## 2024-11-07 DIAGNOSIS — N18.32 HYPERTENSIVE KIDNEY DISEASE WITH STAGE 3B CHRONIC KIDNEY DISEASE (MULTI): ICD-10-CM

## 2024-11-07 DIAGNOSIS — E03.9 HYPOTHYROIDISM, UNSPECIFIED TYPE: ICD-10-CM

## 2024-11-07 LAB
ALBUMIN SERPL BCP-MCNC: 4 G/DL (ref 3.4–5)
ANION GAP SERPL CALCULATED.3IONS-SCNC: 10 MMOL/L (ref 10–20)
APPEARANCE UR: CLEAR
BILIRUB UR STRIP.AUTO-MCNC: NEGATIVE MG/DL
BUN SERPL-MCNC: 19 MG/DL (ref 6–23)
CALCIUM SERPL-MCNC: 9.1 MG/DL (ref 8.6–10.3)
CHLORIDE SERPL-SCNC: 108 MMOL/L (ref 98–107)
CO2 SERPL-SCNC: 27 MMOL/L (ref 21–32)
COLOR UR: COLORLESS
CREAT SERPL-MCNC: 1.59 MG/DL (ref 0.5–1.05)
CREAT UR-MCNC: 46.2 MG/DL (ref 20–320)
CREAT UR-MCNC: 46.2 MG/DL (ref 20–320)
EGFRCR SERPLBLD CKD-EPI 2021: 35 ML/MIN/1.73M*2
ERYTHROCYTE [DISTWIDTH] IN BLOOD BY AUTOMATED COUNT: 14.9 % (ref 11.5–14.5)
GLUCOSE SERPL-MCNC: 116 MG/DL (ref 74–99)
GLUCOSE UR STRIP.AUTO-MCNC: ABNORMAL MG/DL
HCT VFR BLD AUTO: 36.8 % (ref 36–46)
HGB BLD-MCNC: 12.1 G/DL (ref 12–16)
KETONES UR STRIP.AUTO-MCNC: NEGATIVE MG/DL
LEUKOCYTE ESTERASE UR QL STRIP.AUTO: NEGATIVE
MCH RBC QN AUTO: 31.8 PG (ref 26–34)
MCHC RBC AUTO-ENTMCNC: 32.9 G/DL (ref 32–36)
MCV RBC AUTO: 97 FL (ref 80–100)
MICROALBUMIN UR-MCNC: 31.7 MG/L
MICROALBUMIN/CREAT UR: 68.6 UG/MG CREAT
MUCOUS THREADS #/AREA URNS AUTO: NORMAL /LPF
NITRITE UR QL STRIP.AUTO: NEGATIVE
NRBC BLD-RTO: 0 /100 WBCS (ref 0–0)
PH UR STRIP.AUTO: 6.5 [PH]
PHOSPHATE SERPL-MCNC: 3.5 MG/DL (ref 2.5–4.9)
PLATELET # BLD AUTO: 186 X10*3/UL (ref 150–450)
POTASSIUM SERPL-SCNC: 3.9 MMOL/L (ref 3.5–5.3)
PROT UR STRIP.AUTO-MCNC: ABNORMAL MG/DL
PROT UR-MCNC: 29 MG/DL (ref 5–24)
PROT/CREAT UR: 0.63 MG/MG CREAT (ref 0–0.17)
PTH-INTACT SERPL-MCNC: 90.1 PG/ML (ref 18.5–88)
RBC # BLD AUTO: 3.81 X10*6/UL (ref 4–5.2)
RBC # UR STRIP.AUTO: NEGATIVE /UL
RBC #/AREA URNS AUTO: NORMAL /HPF
SODIUM SERPL-SCNC: 141 MMOL/L (ref 136–145)
SP GR UR STRIP.AUTO: 1.01
TSH SERPL-ACNC: 0.92 MIU/L (ref 0.44–3.98)
UROBILINOGEN UR STRIP.AUTO-MCNC: NORMAL MG/DL
WBC # BLD AUTO: 6.5 X10*3/UL (ref 4.4–11.3)
WBC #/AREA URNS AUTO: NORMAL /HPF

## 2024-11-07 PROCEDURE — 82570 ASSAY OF URINE CREATININE: CPT

## 2024-11-07 PROCEDURE — 82306 VITAMIN D 25 HYDROXY: CPT

## 2024-11-07 PROCEDURE — 36415 COLL VENOUS BLD VENIPUNCTURE: CPT

## 2024-11-07 PROCEDURE — 80069 RENAL FUNCTION PANEL: CPT

## 2024-11-07 PROCEDURE — 85027 COMPLETE CBC AUTOMATED: CPT

## 2024-11-07 PROCEDURE — 81001 URINALYSIS AUTO W/SCOPE: CPT

## 2024-11-07 PROCEDURE — 84156 ASSAY OF PROTEIN URINE: CPT

## 2024-11-07 PROCEDURE — 84443 ASSAY THYROID STIM HORMONE: CPT

## 2024-11-07 PROCEDURE — 82043 UR ALBUMIN QUANTITATIVE: CPT

## 2024-11-07 PROCEDURE — 83970 ASSAY OF PARATHORMONE: CPT

## 2024-11-08 LAB — 25(OH)D3 SERPL-MCNC: 38 NG/ML (ref 30–100)

## 2024-11-12 ENCOUNTER — APPOINTMENT (OUTPATIENT)
Dept: NEPHROLOGY | Facility: CLINIC | Age: 69
End: 2024-11-12
Payer: MEDICARE

## 2024-11-12 DIAGNOSIS — N18.32 STAGE 3B CHRONIC KIDNEY DISEASE (CKD) (MULTI): Primary | ICD-10-CM

## 2024-11-12 PROCEDURE — 99213 OFFICE O/P EST LOW 20 MIN: CPT | Performed by: INTERNAL MEDICINE

## 2024-11-12 PROCEDURE — 1157F ADVNC CARE PLAN IN RCRD: CPT | Performed by: INTERNAL MEDICINE

## 2024-11-12 PROCEDURE — 1159F MED LIST DOCD IN RCRD: CPT | Performed by: INTERNAL MEDICINE

## 2024-11-12 NOTE — PROGRESS NOTES
An interactive audio and video telecommunication system which permits real time communications between the patient (at the originating site) and provider (at the distant site) was utilized to provide this telehealth service.  Verbal consent was requested and obtained from patient on this date for a telehealth visit.    No new complaints  Took  out of SNF  No recent hospitalizations  Denies nausea, vomiting, chest pain, dyspnea  No urinary symptoms     Home Blood pressures 120-130s systolic  Weight stable  NAD  Sclera AI s inj  MMM no sores  No tremor  Appropriate  No edema    Stage 3b CKD , stable  Mild proteinuria improved  HTN control improved    Following with Clinical pharmacy re blood pressure  Labs and follow up in 6 months  No med changes

## 2024-11-13 DIAGNOSIS — E55.9 VITAMIN D DEFICIENCY: ICD-10-CM

## 2024-11-13 RX ORDER — ACETAMINOPHEN 500 MG
2000 TABLET ORAL DAILY
Qty: 90 CAPSULE | Refills: 1 | Status: SHIPPED | OUTPATIENT
Start: 2024-11-13

## 2024-11-18 ENCOUNTER — APPOINTMENT (OUTPATIENT)
Dept: NEPHROLOGY | Facility: CLINIC | Age: 69
End: 2024-11-18
Payer: MEDICARE

## 2024-12-04 ENCOUNTER — APPOINTMENT (OUTPATIENT)
Dept: PALLIATIVE MEDICINE | Facility: CLINIC | Age: 69
End: 2024-12-04
Payer: MEDICARE

## 2024-12-04 DIAGNOSIS — Z63.6 CAREGIVER BURDEN: ICD-10-CM

## 2024-12-04 DIAGNOSIS — N18.32 CKD STAGE 3B, GFR 30-44 ML/MIN (MULTI): ICD-10-CM

## 2024-12-04 DIAGNOSIS — Z51.5 PALLIATIVE CARE ENCOUNTER: Primary | ICD-10-CM

## 2024-12-04 PROCEDURE — G2212 PROLONG OUTPT/OFFICE VIS: HCPCS

## 2024-12-04 PROCEDURE — 99497 ADVNCD CARE PLAN 30 MIN: CPT

## 2024-12-04 PROCEDURE — 99205 OFFICE O/P NEW HI 60 MIN: CPT

## 2024-12-04 PROCEDURE — 1157F ADVNC CARE PLAN IN RCRD: CPT

## 2024-12-04 SDOH — SOCIAL STABILITY - SOCIAL INSECURITY: DEPENDENT RELATIVE NEEDING CARE AT HOME: Z63.6

## 2024-12-05 NOTE — PATIENT INSTRUCTIONS
Continue connecting with your systems of support, attending appointments, and taking medications as prescribed.

## 2024-12-05 NOTE — PROGRESS NOTES
"Palliative Medicine Outpatient Visit    Subjective    History of Present Illness  Cleo Corral is a 69-year-old woman with a past medical history of chronic myeloid leukemia currently in remission, HTN, hypothyroidism, GERD, osteoarthritis, CKD 3B.  Referred to outpatient palliative care by Dr. Pruitt/nephrology.  Introduction to Palliative Care  Phone call placed to patient for telemedicine visit.  Palliative Medicine was introduced as a specialty service for patients with serious illness to help with symptom management, improve quality of life, assist with goals of care conversations, navigate complex decision making, and provide support to patients and families. Support and empathy was provided throughout the encounter. Provided reflective listening and presence.  Present during conversation: Myself and patient    Symptoms  Pain: Denies  Dyspnea: Denies  Insomnia: Denies  Constipation: Denies  Nausea: Denies  Appetite: Good  Anxiety: Reports \"financial overwhelm\"  Depression: Currently depressed related to 's health.  Reports anticipatory grieving, no suicidal thoughts or ideation.    Palliative medicine social history:  Patient has been  to spouse for 42 years.   has severe dementia.  He was recently put into a nursing home.  In October.  Patient very tearful during our conversation, briefly patient was 's caretaker for about 10 weeks.  He is bed ridden and \"she did the best she could to try and take care of him.\"  Main joys includes family and food.  States she has many friends and a very states large support system.  Has 4 kids, 7 grandkids.  She is \"not a materialistic person, does not need to travel, family is her biggest source of strength\".  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  Goes to doctors appointments, gets blood work, takes " medications as prescribed.    Objective        Colonoscopy Screening  Table formatting from the original result was not included.  Impression  One 15 mm polyp in the proximal ascending colon; lift performed; performed   cold snare with piecemeal removal  Diverticulosis in the descending colon and sigmoid colon  Normal.    Findings  One 15 mm sessile polyp in the proximal ascending colon; lift performed   with 5 mL of Blue Boost injected into the submucosa; performed cold snare   with complete piecemeal removal and retrieved specimen  Multiple medium and large diverticula in the descending colon and sigmoid   colon  The examination was otherwise normal;    Recommendation   Await pathology results    Follow up with PCP    Repeat Colonoscopy in 2 years    Personal history of colon polyps       Indication  Polyp of colon    Staff  Staff Role   Aston Holt MD Proceduralist     Medications  meperidine PF (Demerol) injection 100 mg   midazolam (Versed) injection 4 mg   (Totals for administrations occurring from 1014 to 1101 on 02/15/24)     Preprocedure  A history and physical has been performed, and patient medication   allergies have been reviewed. The patient's tolerance of previous   anesthesia has been reviewed. The risks and benefits of the procedure and   the sedation options and risks were discussed with the patient. All   questions were answered and informed consent obtained.    Details of the Procedure  The patient underwent moderate sedation, which was administered by the   procedural nurse. The patient's blood pressure, ECG, ETCO2, heart rate,   level of consciousness, oxygen and respirations were monitored throughout   the procedure. A digital rectal exam was performed. The scope was   introduced through the anus and advanced to the cecum. Retroflexion was   performed in the rectum. The quality of bowel preparation was evaluated   using the [place] Bowel Preparation Scale with scores of: right colon = 3,    transverse colon = 3, left colon = 3. The total BBPS score was 9. Bowel   prep was adequate. The patient's estimated blood loss was minimal (<5 mL).   The procedure was moderately difficult due to loops in the digestive   tract. In response to procedure difficulty, counter pressure was applied   and the patient was repositioned. The patient tolerated the procedure   well. There were no apparent adverse events.     Events  Procedure Events   Event Event Time   ENDO SCOPE IN TIME 2/15/2024 10:33 AM   ENDO CECUM REACHED 2/15/2024 10:45 AM   ENDO SCOPE OUT TIME 2/15/2024 11:00 AM     Specimens  ID Type Source Tests Collected by Time   1 : polyp- cold snare Tissue COLON - ASCENDING POLYP SURGICAL PATHOLOGY   EXAM Aston Holt MD 2/15/2024 1047     Procedure Location  [address] [address] [phone]    Referring Provider  Aston Holt MD  [id number] [facility]  Department Of Medicine-[place] [address]    Procedure Provider  Aston Holt MD     No results found for this or any previous visit (from the past 4464 hours).   Patient has no known allergies.    Assessment/Plan    Cleo Corral is a 69-year-old woman with a past medical history of chronic myeloid leukemia currently in remission, HTN, hypothyroidism, GERD, osteoarthritis, CKD 3B.  Referred to outpatient palliative care by Dr. Pruitt/nephrology.    ----------------------------------------------------------------------------------------------------------------------------------------------------------------------------------------------------------------------------  Advanced Care Planning  Patient and/or family consented to a voluntary Advanced Care Planning meeting.   Serious Illness Assessment and Counseling:  CKD 3B    Disease Specific Information Provided/Prognosis Discussed: Patient's current clinical condition, including diagnosis, and management plan were  "discussed.   Counseling provided on the irreversible and progressive nature of patient's diseases including advanced kidney disease    Understanding/Overall Impression: Patient expressing clear understanding of overall health status and severity of illness.     Goals/Hopes: Discussion ensued about patient's goals for their medical care going forward. Allowed patient time to talk about his/her current quality of life, disease course/progression, and symptom and treatment burden.  Current treatment burden intolerable, patient is feeling physically well    Fears/Worries/Concerns: Patient's main concern today is regarding patient's , she seems to be experiencing grief towards his the loss of independence    Health Preferences and Priorities with Disease Progression:   Family is not aware of patient wishes as disease progresses to a terminal state.   At this time, patient prefers to keep her care private from her children, \"does not want to burden them with her health issues when their dad is sick with advanced dementia\"    Advanced Directives:  Counseling provided on the importance of not crisis planning as disease burden progresses but to establish treatment limitations now so in the future medical team will be clear on what patient feels is an acceptable quality of life for the patient and what treatment limitations' patient would like set into place based on that.       HPOA: Yes, on file   Living will: Yes, on file      I spent 16 minutes in providing separately identifiable ACP services with the patient and/or surrogate decision maker in a voluntary conversation discussing the patient's wishes and goals as detailed in the above note.   ----------------------------------------------------------------------------------------------------------------------------------------------------------------------------------------------------------------------------    #Advanced Care Planning  -code status: Full code  - " Advanced Directives on file   -Ongoing supportive discussions    #psychosocial support  #Anticipatory grieving related to spouse health, caregiver burden  -Patient states that daughter-in-law is a therapist, patient is not interested in counseling or therapy but is welcoming of ongoing palliative care support      Medical Decision Making was high level due to high complexity of problems, extensive data review, and high risk of management/treatment.       Patience Aviles DNP, APRN-CNP

## 2024-12-06 ENCOUNTER — TELEMEDICINE (OUTPATIENT)
Dept: GERIATRIC MEDICINE | Facility: CLINIC | Age: 69
End: 2024-12-06
Payer: MEDICARE

## 2024-12-06 DIAGNOSIS — E87.6 HYPOKALEMIA: Primary | ICD-10-CM

## 2024-12-06 DIAGNOSIS — Z12.31 ENCOUNTER FOR SCREENING MAMMOGRAM FOR MALIGNANT NEOPLASM OF BREAST: ICD-10-CM

## 2024-12-06 DIAGNOSIS — Z63.6 CAREGIVER STRESS: ICD-10-CM

## 2024-12-06 PROBLEM — L73.9 FOLLICULITIS: Status: RESOLVED | Noted: 2024-01-04 | Resolved: 2024-12-06

## 2024-12-06 PROBLEM — K63.5 POLYP OF COLON: Status: ACTIVE | Noted: 2024-12-06

## 2024-12-06 PROBLEM — F41.9 ANXIETY: Status: RESOLVED | Noted: 2024-01-04 | Resolved: 2024-12-06

## 2024-12-06 PROCEDURE — 1157F ADVNC CARE PLAN IN RCRD: CPT | Performed by: NURSE PRACTITIONER

## 2024-12-06 PROCEDURE — 1036F TOBACCO NON-USER: CPT | Performed by: NURSE PRACTITIONER

## 2024-12-06 PROCEDURE — 99215 OFFICE O/P EST HI 40 MIN: CPT | Performed by: NURSE PRACTITIONER

## 2024-12-06 RX ORDER — POTASSIUM CHLORIDE 750 MG/1
50 TABLET, FILM COATED, EXTENDED RELEASE ORAL DAILY
Qty: 150 TABLET | Refills: 5 | Status: SHIPPED | OUTPATIENT
Start: 2024-12-06 | End: 2025-06-04

## 2024-12-06 SDOH — SOCIAL STABILITY - SOCIAL INSECURITY: DEPENDENT RELATIVE NEEDING CARE AT HOME: Z63.6

## 2024-12-06 NOTE — PROGRESS NOTES
Virtual or Telephone Consent    An interactive audio and video telecommunication system which permits real time communications between the patient (at the originating site) and provider (at the distant site) was utilized to provide this telehealth service.   Verbal consent was requested and obtained from Cleo Corral on this date, 12/06/24 for a telehealth visit.      Subjective   With patient's permission, this is a Telemedicine visit with video and audio. The provider and patient participated in this telemedicine encounter.     Cleo Corral is a 69 y.o. female on telehealth visit for Follow-up.    Cleo Corral is a/an 69 y.o. female who presents today for a geriatric medicine and primary care follow-up. Patient presents for: Follow-up. Pt is alone  for virtual visit so the history is as accurate as and limited by her own report/memory. Their main concern today is recently moving her  to skilled nursing, needing a mammogram, and refilling potassium prescription. She reports her 's dementia had become overwhelming, especially at night and even with hired caregivers it was more than she could handle. He is now is LTC about 25 minutes away. She feels badly about moving him out of their home of 40+ years of marriage. She was somewhat tearful during the visit. Reports she is not getting the best sleep recently. Will ask for an intervention if it becomes too much.      Current Outpatient Medications on File Prior to Visit   Medication Sig Dispense Refill    carvedilol (Coreg) 25 mg tablet Take 1 tablet (25 mg) by mouth 2 times a day. 180 tablet 3    cholecalciferol (Vitamin D-3) 50 mcg (2,000 unit) capsule Take 1 capsule (50 mcg) by mouth once daily. 90 capsule 1    famotidine (Pepcid) 20 mg tablet TAKE 1 TABLET BY MOUTH EVERY DAY AS DIRECTED (Patient not taking: Reported on 4/11/2024) 90 tablet 1    Farxiga 10 mg Take 1 tablet (10 mg) by mouth once daily. TAKE ONE TABLET BY MOUTH ONCE DAILY IN THE  MORNING 90 tablet 3    imatinib (Gleevec) 400 mg tablet Take 1 tablet (400 mg total) by mouth once every 24 hours.      levothyroxine (Synthroid, Levoxyl) 125 mcg tablet Take 1 tablet (125 mcg) by mouth once daily. 30 tablet 11    lisinopril 40 mg tablet TAKE 1 TABLET ONCE DAILY 90 tablet 3    [DISCONTINUED] potassium chloride CR 10 mEq ER tablet Take 5 tablets (50 mEq) by mouth once daily.       No current facility-administered medications on file prior to visit.    No Known Allergies     Past Medical History:   Diagnosis Date    Anxiety 2024    Encounter for gynecological examination (general) (routine) without abnormal findings 2018    Well woman exam with routine gynecological exam    Essential (primary) hypertension     HTN (hypertension), benign    Folliculitis 2024    Personal history of malignant neoplasm, unspecified     History of malignant neoplasm     Past Surgical History:   Procedure Laterality Date     SECTION, LOW TRANSVERSE  2015     Section Low Transverse    OTHER SURGICAL HISTORY  2019    Cataract surgery    OTHER SURGICAL HISTORY  2019    Hip replacement     Social Drivers of Health     Tobacco Use: Low Risk  (2024)    Received from [facility]    Patient History     Smoking Tobacco Use: Never     Smokeless Tobacco Use: Never     Passive Exposure: Not on file   Alcohol Use: Not on file   Financial Resource Strain: Not on file   Food Insecurity: Not on file   Transportation Needs: Not on file   Physical Activity: Not on file   Stress: Not on file   Social Connections: Not on file   Intimate Partner Violence: Not on file   Depression: Not at risk (2024)    PHQ-2     PHQ-2 Score: 0   Housing Stability: Not on file   Utilities: Not on file   Digital Equity: Not on file   Health Literacy: Not on file     Family History   Problem Relation Name Age of Onset    Breast cancer Mother's Sister       Objective   There were no vitals taken  for this visit.    Physical Exam  Looks good on video, no respiratory distress     Latest Reference Range & Units Most Recent   GLUCOSE 74 - 99 mg/dL 116 (H)  11/7/24 13:38   SODIUM 136 - 145 mmol/L 141  11/7/24 13:38   POTASSIUM 3.5 - 5.3 mmol/L 3.9  11/7/24 13:38   CHLORIDE 98 - 107 mmol/L 108 (H)  11/7/24 13:38   Bicarbonate 21 - 32 mmol/L 27  11/7/24 13:38   Anion Gap 10 - 20 mmol/L 10  11/7/24 13:38   Blood Urea Nitrogen 6 - 23 mg/dL 19  11/7/24 13:38   Creatinine 0.50 - 1.05 mg/dL 1.59 (H)  11/7/24 13:38   EGFR >60 mL/min/1.73m*2 35 (L)  11/7/24 13:38   Calcium 8.6 - 10.3 mg/dL 9.1  11/7/24 13:38   PHOSPHORUS 2.5 - 4.9 mg/dL 3.5  11/7/24 13:38   Albumin 3.4 - 5.0 g/dL 4.0  11/7/24 13:38      Latest Reference Range & Units Most Recent   Hemoglobin A1C % 5.6  2/1/23 09:39   Parathyroid Hormone, Intact 18.5 - 88.0 pg/mL 90.1 (H)  11/7/24 13:38   Thyroxine, Free 0.78 - 1.48 ng/dL 1.49 (H)  4/11/24 16:19   Renin Activity ng/mL/hr 1.0  12/7/21 10:17   Thyroid Stimulating Hormone 0.44 - 3.98 mIU/L 0.92  11/7/24 13:38   Vitamin D, 25-Hydroxy, Total 30 - 100 ng/mL 38  11/7/24 13:38   GLUCOSE 74 - 99 mg/dL 116 (H)  11/7/24 13:38   Estimated Average Glucose MG/  2/1/23 09:39      Latest Reference Range & Units Most Recent   WBC 4.4 - 11.3 x10*3/uL 6.5  11/7/24 13:38   nRBC 0.0 - 0.0 /100 WBCs 0.0  11/7/24 13:38   RBC 4.00 - 5.20 x10*6/uL 3.81 (L)  11/7/24 13:38   HEMOGLOBIN 12.0 - 16.0 g/dL 12.1  11/7/24 13:38   HEMATOCRIT 36.0 - 46.0 % 36.8  11/7/24 13:38   MCV 80 - 100 fL 97  11/7/24 13:38   MCH 26.0 - 34.0 pg 31.8  11/7/24 13:38   MCHC 32.0 - 36.0 g/dL 32.9  11/7/24 13:38   RED CELL DISTRIBUTION WIDTH 11.5 - 14.5 % 14.9 (H)  11/7/24 13:38   Platelets 150 - 450 x10*3/uL 186  11/7/24 13:38      Latest Reference Range & Units Most Recent   Color, Urine Light-Yellow, Yellow, Dark-Yellow  Colorless !  11/7/24 13:38   Appearance, Urine Clear  Clear  11/7/24 13:38   Specific Gravity, Urine 1.005 - 1.035  1.012  11/7/24  13:38   pH, Urine 5.0, 5.5, 6.0, 6.5, 7.0, 7.5, 8.0  6.5  11/7/24 13:38   Protein, Urine NEGATIVE, 10 (TRACE), 20 (TRACE) mg/dL 10 (TRACE)  11/7/24 13:38   Glucose, Urine Normal mg/dL 500 (3+) !  11/7/24 13:38   Blood, Urine NEGATIVE  NEGATIVE  11/7/24 13:38   Ketones, Urine NEGATIVE mg/dL NEGATIVE  11/7/24 13:38   Bilirubin, Urine NEGATIVE  NEGATIVE  11/7/24 13:38   Urobilinogen, Urine Normal mg/dL Normal  11/7/24 13:38   Nitrite, Urine NEGATIVE  NEGATIVE  11/7/24 13:38   Leukocyte Esterase, Urine NEGATIVE  NEGATIVE  11/7/24 13:38   Albumin, Urine Random Not established mg/L 31.7  11/7/24 13:38   Creatinine, Urine Random 20.0 - 320.0 mg/dL  20.0 - 320.0 mg/dL 46.2  11/7/24 13:38  46.2  11/7/24 13:38   Albumin/Creatinine Ratio <30.0 ug/mg Creat 68.6 (H)  11/7/24 13:38   EGFR >60 mL/min/1.73m*2 35 (L)  11/7/24 13:38       Problem List Items Addressed This Visit             ICD-10-CM    Hypokalemia - Primary E87.6    Relevant Medications    potassium chloride CR 10 mEq ER tablet    Other Relevant Orders    Follow Up In Geriatrics    Caregiver stress Z63.6    Relevant Orders    Follow Up In Geriatrics     Other Visit Diagnoses         Codes    Encounter for screening mammogram for malignant neoplasm of breast     Z12.31    Relevant Orders    BI mammo bilateral screening tomosynthesis    Follow Up In Geriatrics           Time Spent  Prep time on day of patient encounter: 0 minutes  Time spent directly with patient, family or caregiver: 29 minutes  Additional Time Spent on Patient Care Activities: 8 minutes (Ordering mammorgram, refilling prescription)  Documentation Time: 21 minutes  Other Time Spent: 0 minutes  Total: 58 minutes         Tanya Potter, APRN-CNP   Geriatric Medicine

## 2024-12-23 PROCEDURE — RXMED WILLOW AMBULATORY MEDICATION CHARGE

## 2024-12-24 ENCOUNTER — PHARMACY VISIT (OUTPATIENT)
Dept: PHARMACY | Facility: CLINIC | Age: 69
End: 2024-12-24
Payer: COMMERCIAL

## 2025-01-27 ENCOUNTER — APPOINTMENT (OUTPATIENT)
Dept: RADIOLOGY | Facility: CLINIC | Age: 70
End: 2025-01-27
Payer: MEDICARE

## 2025-01-29 ENCOUNTER — HOSPITAL ENCOUNTER (OUTPATIENT)
Dept: RADIOLOGY | Facility: CLINIC | Age: 70
Discharge: HOME | End: 2025-01-29
Payer: MEDICARE

## 2025-01-29 VITALS — WEIGHT: 229.28 LBS | BODY MASS INDEX: 36.85 KG/M2 | HEIGHT: 66 IN

## 2025-01-29 DIAGNOSIS — Z12.31 ENCOUNTER FOR SCREENING MAMMOGRAM FOR MALIGNANT NEOPLASM OF BREAST: ICD-10-CM

## 2025-01-29 PROCEDURE — 77067 SCR MAMMO BI INCL CAD: CPT

## 2025-01-29 PROCEDURE — 77063 BREAST TOMOSYNTHESIS BI: CPT | Performed by: RADIOLOGY

## 2025-01-29 PROCEDURE — 77067 SCR MAMMO BI INCL CAD: CPT | Performed by: RADIOLOGY

## 2025-03-18 PROCEDURE — RXMED WILLOW AMBULATORY MEDICATION CHARGE

## 2025-03-20 ENCOUNTER — PHARMACY VISIT (OUTPATIENT)
Dept: PHARMACY | Facility: CLINIC | Age: 70
End: 2025-03-20
Payer: COMMERCIAL

## 2025-03-26 ENCOUNTER — APPOINTMENT (OUTPATIENT)
Dept: PHARMACY | Facility: HOSPITAL | Age: 70
End: 2025-03-26
Payer: MEDICARE

## 2025-04-03 ENCOUNTER — APPOINTMENT (OUTPATIENT)
Dept: PHARMACY | Facility: HOSPITAL | Age: 70
End: 2025-04-03
Payer: MEDICARE

## 2025-04-03 DIAGNOSIS — I12.9 HYPERTENSIVE KIDNEY DISEASE WITH STAGE 3B CHRONIC KIDNEY DISEASE (MULTI): ICD-10-CM

## 2025-04-03 DIAGNOSIS — N18.32 HYPERTENSIVE KIDNEY DISEASE WITH STAGE 3B CHRONIC KIDNEY DISEASE (MULTI): ICD-10-CM

## 2025-04-03 NOTE — PROGRESS NOTES
Subjective   Patient ID: Cleo Corral is a 70 y.o. female who presents for CKD    Referring Provider: Isi RODRIGUEZ  HPI: CKD stage 3b/a2 last EGFR 30 sCr 1.8 UACR 114.8   Continues tolerating the farxiga well. Patient is taking the carvedilol 25 mg BID and is having no issues. No dizziness or other symptoms of low BP/HR to report.    HTN:   BP: 173/102 at last ov.  Home BP readings:  -overall improving with higher dose of carvedilol. Pt states the majority of her readings the systolic BP is in the 130s  BP   Medications  Carvedilol 25 mg bid  Lisinopril 40mg every day    glucose: well controlled and monitored  A1C 5.6  Farxiga 10 mg once daily    HLD:    On statin? no    Medications reviewed for appropriate dosing in setting of CKD  Recommended changes:  - no adjustments needed at this time    Objective     There were no vitals taken for this visit.     Labs  Lab Results   Component Value Date    BILITOT 0.5 03/31/2021    CALCIUM 9.1 11/07/2024    CO2 27 11/07/2024     (H) 11/07/2024    CREATININE 1.59 (H) 11/07/2024    GLUCOSE 116 (H) 11/07/2024    ALKPHOS 64 03/31/2021    K 3.9 11/07/2024    PROT 7.4 07/12/2021     11/07/2024    AST 25 03/31/2021    ALT 30 03/31/2021    BUN 19 11/07/2024    ANIONGAP 10 11/07/2024    MG 2.18 04/11/2024    PHOS 3.5 11/07/2024    ALBUMIN 4.0 11/07/2024    GFRF 39 (A) 04/28/2023     Lab Results   Component Value Date    TRIG 274 (H) 02/01/2023    CHOL 200 (H) 02/01/2023    HDL 41.3 02/01/2023     Lab Results   Component Value Date    HGBA1C 5.6 02/01/2023       Current Outpatient Medications on File Prior to Visit   Medication Sig Dispense Refill    carvedilol (Coreg) 25 mg tablet Take 1 tablet (25 mg) by mouth 2 times a day. 180 tablet 3    cholecalciferol (Vitamin D-3) 50 mcg (2,000 unit) capsule Take 1 capsule (50 mcg) by mouth once daily. 90 capsule 1    famotidine (Pepcid) 20 mg tablet TAKE 1 TABLET BY MOUTH EVERY DAY AS DIRECTED (Patient not taking:  Reported on 4/11/2024) 90 tablet 1    Farxiga 10 mg Take 1 tablet (10 mg) by mouth once daily. TAKE ONE TABLET BY MOUTH ONCE DAILY IN THE MORNING 90 tablet 3    imatinib (Gleevec) 400 mg tablet Take 1 tablet (400 mg total) by mouth once every 24 hours.      levothyroxine (Synthroid, Levoxyl) 125 mcg tablet Take 1 tablet (125 mcg) by mouth once daily. 30 tablet 11    lisinopril 40 mg tablet TAKE 1 TABLET ONCE DAILY 90 tablet 3    potassium chloride CR 10 mEq ER tablet Take 5 tablets (50 mEq) by mouth once daily. Do not crush, chew, or split. 150 tablet 5     No current facility-administered medications on file prior to visit.        Assessment/Plan   PATIENT EDUCATION/DISCUSSION:  - Counseled patient on MOA, expectations, side effects, duration of therapy, contraindications, administration, and monitoring parameters  - Answered all patient questions and concerns  - enrolled in  patient assistance mik 4/23/25  - home bps elevated since last visit. Significant life stressors with passing of  in January. Also getting worked up by cardiology for potential murmur? Monitor for now and we will touch base on bps in march    _______________________________________________________________________  PLAN  1. CONTINUE Farxiga 10mg every day and carvedilol 25 mg bid.  2.  Continue all other medications.  3. Prescription sent to [facility] pharmacy for assistance on authorization and copay. Medication will be mailed to patient.  4. Next check-in will be 5/8 @ 1020am    Duy Sutton, PharmD    Continue all meds under the continuation of care with the referring provider and clinical pharmacy team.

## 2025-04-07 DIAGNOSIS — E03.9 HYPOTHYROIDISM, UNSPECIFIED TYPE: ICD-10-CM

## 2025-04-07 RX ORDER — LEVOTHYROXINE SODIUM 125 UG/1
125 TABLET ORAL DAILY
Qty: 30 TABLET | Refills: 11 | Status: SHIPPED | OUTPATIENT
Start: 2025-04-07

## 2025-04-08 DIAGNOSIS — E87.6 HYPOKALEMIA: ICD-10-CM

## 2025-04-08 RX ORDER — POTASSIUM CHLORIDE 750 MG/1
TABLET, EXTENDED RELEASE ORAL
Qty: 450 TABLET | Refills: 1 | Status: SHIPPED | OUTPATIENT
Start: 2025-04-08

## 2025-04-16 DIAGNOSIS — E03.9 HYPOTHYROIDISM, UNSPECIFIED TYPE: ICD-10-CM

## 2025-04-22 DIAGNOSIS — E03.9 HYPOTHYROIDISM, UNSPECIFIED TYPE: ICD-10-CM

## 2025-04-22 RX ORDER — LEVOTHYROXINE SODIUM 125 UG/1
125 TABLET ORAL DAILY
Qty: 90 TABLET | Refills: 3 | Status: SHIPPED | OUTPATIENT
Start: 2025-04-22

## 2025-05-08 ENCOUNTER — APPOINTMENT (OUTPATIENT)
Dept: PHARMACY | Facility: HOSPITAL | Age: 70
End: 2025-05-08
Payer: MEDICARE

## 2025-05-08 DIAGNOSIS — N18.32 HYPERTENSIVE KIDNEY DISEASE WITH STAGE 3B CHRONIC KIDNEY DISEASE (MULTI): ICD-10-CM

## 2025-05-08 DIAGNOSIS — I12.9 HYPERTENSIVE KIDNEY DISEASE WITH STAGE 3B CHRONIC KIDNEY DISEASE (MULTI): ICD-10-CM

## 2025-05-08 NOTE — PROGRESS NOTES
Subjective   Patient ID: Cleo Corral is a 70 y.o. female who presents for CKD    Referring Provider: Isi RODRIGUEZ  HPI: CKD stage 3b/a2 last EGFR 30 sCr 1.8 UACR 114.8     HTN:   BP: 173/102 at last ov.  Home BP readings:  130s  BP   Medications  Carvedilol 25 mg bid  Lisinopril 40mg every day    glucose: well controlled and monitored  A1C 5.6  Farxiga 10 mg once daily    HLD:    On statin? no    Medications reviewed for appropriate dosing in setting of CKD  Recommended changes:  - no adjustments needed at this time    Objective     There were no vitals taken for this visit.     Labs  Lab Results   Component Value Date    BILITOT 0.5 03/31/2021    CALCIUM 9.1 11/07/2024    CO2 27 11/07/2024     (H) 11/07/2024    CREATININE 1.59 (H) 11/07/2024    GLUCOSE 116 (H) 11/07/2024    ALKPHOS 64 03/31/2021    K 3.9 11/07/2024    PROT 7.4 07/12/2021     11/07/2024    AST 25 03/31/2021    ALT 30 03/31/2021    BUN 19 11/07/2024    ANIONGAP 10 11/07/2024    MG 2.18 04/11/2024    PHOS 3.5 11/07/2024    ALBUMIN 4.0 11/07/2024    GFRF 39 (A) 04/28/2023     Lab Results   Component Value Date    TRIG 274 (H) 02/01/2023    CHOL 200 (H) 02/01/2023    HDL 41.3 02/01/2023     Lab Results   Component Value Date    HGBA1C 5.6 02/01/2023       Current Outpatient Medications on File Prior to Visit   Medication Sig Dispense Refill    carvedilol (Coreg) 25 mg tablet Take 1 tablet (25 mg) by mouth 2 times a day. 180 tablet 3    cholecalciferol (Vitamin D-3) 50 mcg (2,000 unit) capsule Take 1 capsule (50 mcg) by mouth once daily. 90 capsule 1    famotidine (Pepcid) 20 mg tablet TAKE 1 TABLET BY MOUTH EVERY DAY AS DIRECTED (Patient not taking: Reported on 4/11/2024) 90 tablet 1    Farxiga 10 mg Take 1 tablet (10 mg) by mouth once daily. TAKE ONE TABLET BY MOUTH ONCE DAILY IN THE MORNING 90 tablet 3    imatinib (Gleevec) 400 mg tablet Take 1 tablet (400 mg total) by mouth once every 24 hours.      levothyroxine  (Synthroid, Levoxyl) 125 mcg tablet Take 1 tablet (125 mcg) by mouth once daily. 90 tablet 3    lisinopril 40 mg tablet TAKE 1 TABLET ONCE DAILY 90 tablet 3    potassium chloride CR 10 mEq ER tablet TAKE 5 TABLETS (50 MEQ) BY MOUTH ONCE DAILY. DO NOT CRUSH, CHEW, OR SPLIT. 450 tablet 1     No current facility-administered medications on file prior to visit.        Assessment/Plan   PATIENT EDUCATION/DISCUSSION:  - Counseled patient on MOA, expectations, side effects, duration of therapy, contraindications, administration, and monitoring parameters  - Answered all patient questions and concerns  - enrolled in  patient assistance mik 4/8/26  - home bps elevated since last visit. Significant life stressors with passing of  in January. Averaging in the 130s but some readings in the 160s. We discussed potentially adding a low dose of a new medication but patient would like to defer until after next in office follow up   - reminded of lab work and follow up with dr urbina in june  _______________________________________________________________________  PLAN  1. CONTINUE Farxiga 10mg every day and carvedilol 25 mg bid.  2.  Continue all other medications.  3. Prescription sent to [facility] pharmacy for assistance on authorization and copay. Medication will be mailed to patient.  4. Next check-in will be 7/17@ 10am    Duy Sutton, PharmD    Continue all meds under the continuation of care with the referring provider and clinical pharmacy team.

## 2025-05-20 DIAGNOSIS — N18.32 HYPERTENSIVE KIDNEY DISEASE WITH STAGE 3B CHRONIC KIDNEY DISEASE (MULTI): Primary | ICD-10-CM

## 2025-05-20 DIAGNOSIS — I12.9 HYPERTENSIVE KIDNEY DISEASE WITH STAGE 3B CHRONIC KIDNEY DISEASE (MULTI): Primary | ICD-10-CM

## 2025-05-20 RX ORDER — LEVOTHYROXINE SODIUM 125 UG/1
125 TABLET ORAL DAILY
Qty: 30 TABLET | Refills: 0 | Status: SHIPPED | OUTPATIENT
Start: 2025-05-20 | End: 2026-05-20

## 2025-05-20 RX ORDER — LISINOPRIL 40 MG/1
40 TABLET ORAL DAILY
Qty: 30 TABLET | Refills: 0 | Status: SHIPPED | OUTPATIENT
Start: 2025-05-20 | End: 2025-06-19

## 2025-06-04 LAB
25(OH)D3+25(OH)D2 SERPL-MCNC: 36 NG/ML (ref 30–100)
ALBUMIN SERPL-MCNC: 4.3 G/DL (ref 3.6–5.1)
ALBUMIN/CREAT UR: 68 MG/G CREAT
APPEARANCE UR: ABNORMAL
BACTERIA #/AREA URNS HPF: ABNORMAL /HPF
BILIRUB UR QL STRIP: NEGATIVE
BUN SERPL-MCNC: 18 MG/DL (ref 7–25)
BUN/CREAT SERPL: 11 (CALC) (ref 6–22)
CALCIUM SERPL-MCNC: 8.9 MG/DL (ref 8.6–10.4)
CHLORIDE SERPL-SCNC: 107 MMOL/L (ref 98–110)
CO2 SERPL-SCNC: 26 MMOL/L (ref 20–32)
COLOR UR: YELLOW
CREAT SERPL-MCNC: 1.63 MG/DL (ref 0.6–1)
CREAT UR-MCNC: 60 MG/DL (ref 20–275)
CREAT UR-MCNC: 60 MG/DL (ref 20–275)
EGFRCR SERPLBLD CKD-EPI 2021: 34 ML/MIN/1.73M2
ERYTHROCYTE [DISTWIDTH] IN BLOOD BY AUTOMATED COUNT: 13.4 % (ref 11–15)
GLUCOSE SERPL-MCNC: 100 MG/DL (ref 65–99)
GLUCOSE UR QL STRIP: ABNORMAL
HCT VFR BLD AUTO: 39.9 % (ref 35–45)
HGB BLD-MCNC: 12.8 G/DL (ref 11.7–15.5)
HGB UR QL STRIP: NEGATIVE
HYALINE CASTS #/AREA URNS LPF: ABNORMAL /LPF
KETONES UR QL STRIP: NEGATIVE
LEUKOCYTE ESTERASE UR QL STRIP: ABNORMAL
MCH RBC QN AUTO: 31.1 PG (ref 27–33)
MCHC RBC AUTO-ENTMCNC: 32.1 G/DL (ref 32–36)
MCV RBC AUTO: 97.1 FL (ref 80–100)
MICROALBUMIN UR-MCNC: 4.1 MG/DL
NITRITE UR QL STRIP: NEGATIVE
PH UR STRIP: 7.5 [PH] (ref 5–8)
PHOSPHATE SERPL-MCNC: 3.2 MG/DL (ref 2.1–4.3)
PLATELET # BLD AUTO: 183 THOUSAND/UL (ref 140–400)
PMV BLD REES-ECKER: 11.9 FL (ref 7.5–12.5)
POTASSIUM SERPL-SCNC: 4.2 MMOL/L (ref 3.5–5.3)
PROT UR QL STRIP: ABNORMAL
PROT UR-MCNC: 27 MG/DL (ref 5–24)
PROT/CREAT UR: 0.45 MG/MG CREAT (ref 0.02–0.18)
PROT/CREAT UR: 450 MG/G CREAT (ref 24–184)
PTH-INTACT SERPL-MCNC: 123 PG/ML (ref 16–77)
RBC # BLD AUTO: 4.11 MILLION/UL (ref 3.8–5.1)
RBC #/AREA URNS HPF: ABNORMAL /HPF
SERVICE CMNT-IMP: ABNORMAL
SODIUM SERPL-SCNC: 143 MMOL/L (ref 135–146)
SP GR UR STRIP: 1.01 (ref 1–1.03)
SQUAMOUS #/AREA URNS HPF: ABNORMAL /HPF
WBC # BLD AUTO: 6.3 THOUSAND/UL (ref 3.8–10.8)
WBC #/AREA URNS HPF: ABNORMAL /HPF

## 2025-06-06 ENCOUNTER — APPOINTMENT (OUTPATIENT)
Dept: NEPHROLOGY | Facility: CLINIC | Age: 70
End: 2025-06-06
Payer: MEDICARE

## 2025-06-06 DIAGNOSIS — I12.9 HYPERTENSIVE KIDNEY DISEASE WITH STAGE 3B CHRONIC KIDNEY DISEASE (MULTI): Primary | ICD-10-CM

## 2025-06-06 DIAGNOSIS — N18.32 HYPERTENSIVE KIDNEY DISEASE WITH STAGE 3B CHRONIC KIDNEY DISEASE (MULTI): Primary | ICD-10-CM

## 2025-06-06 PROCEDURE — 1160F RVW MEDS BY RX/DR IN RCRD: CPT | Performed by: INTERNAL MEDICINE

## 2025-06-06 PROCEDURE — 1157F ADVNC CARE PLAN IN RCRD: CPT | Performed by: INTERNAL MEDICINE

## 2025-06-06 PROCEDURE — 1159F MED LIST DOCD IN RCRD: CPT | Performed by: INTERNAL MEDICINE

## 2025-06-06 PROCEDURE — 99213 OFFICE O/P EST LOW 20 MIN: CPT | Performed by: INTERNAL MEDICINE

## 2025-06-06 RX ORDER — AMLODIPINE BESYLATE 5 MG/1
5 TABLET ORAL DAILY
Qty: 90 TABLET | Refills: 3 | Status: SHIPPED | OUTPATIENT
Start: 2025-06-06 | End: 2025-12-03

## 2025-06-06 NOTE — PROGRESS NOTES
An interactive audio and video telecommunication system which permits real time communications between the patient (at the originating site) and provider (at the distant site) was utilized to provide this telehealth service.  Verbal consent was requested and obtained from patient on this date for a telehealth visit.    No new complaints   passed away 2/25  No recent hospitalizations  Denies nausea, vomiting, chest pain, dyspnea  No urinary symptoms     Home Blood pressures 140-160s systolic  Weight stable  NAD  Sclera AI s inj  MMM no sores  No tremor  Appropriate  No edema    Stage 3b CKD , stable  Mild proteinuria improved  HTN not controled    Following with Clinical pharmacy re blood pressure  Labs and follow up in 6 months with clinical pharm for stage 3b CKD  Amlodipine 5 mg daily in addition to other meds  Follow up with neph in one year

## 2025-06-09 ENCOUNTER — APPOINTMENT (OUTPATIENT)
Dept: NEPHROLOGY | Facility: CLINIC | Age: 70
End: 2025-06-09
Payer: MEDICARE

## 2025-06-12 ENCOUNTER — TELEMEDICINE (OUTPATIENT)
Dept: GERIATRIC MEDICINE | Facility: CLINIC | Age: 70
End: 2025-06-12
Payer: MEDICARE

## 2025-06-12 DIAGNOSIS — K21.9 GASTROESOPHAGEAL REFLUX DISEASE WITHOUT ESOPHAGITIS: ICD-10-CM

## 2025-06-12 DIAGNOSIS — E55.9 VITAMIN D DEFICIENCY: Primary | ICD-10-CM

## 2025-06-12 DIAGNOSIS — E03.9 HYPOTHYROIDISM, UNSPECIFIED TYPE: ICD-10-CM

## 2025-06-12 DIAGNOSIS — Z11.59 MEASLES SCREENING: ICD-10-CM

## 2025-06-12 DIAGNOSIS — C92.10 CHRONIC MYELOID LEUKEMIA (MULTI): ICD-10-CM

## 2025-06-12 DIAGNOSIS — I10 BENIGN ESSENTIAL HYPERTENSION: ICD-10-CM

## 2025-06-12 PROCEDURE — 1160F RVW MEDS BY RX/DR IN RCRD: CPT | Performed by: NURSE PRACTITIONER

## 2025-06-12 PROCEDURE — 99215 OFFICE O/P EST HI 40 MIN: CPT | Performed by: NURSE PRACTITIONER

## 2025-06-12 PROCEDURE — 1159F MED LIST DOCD IN RCRD: CPT | Performed by: NURSE PRACTITIONER

## 2025-06-12 RX ORDER — ACETAMINOPHEN 500 MG
50 TABLET ORAL DAILY
Qty: 90 CAPSULE | Refills: 1 | Status: SHIPPED | OUTPATIENT
Start: 2025-06-12 | End: 2025-12-09

## 2025-06-12 ASSESSMENT — PATIENT HEALTH QUESTIONNAIRE - PHQ9
SUM OF ALL RESPONSES TO PHQ9 QUESTIONS 1 AND 2: 0
2. FEELING DOWN, DEPRESSED OR HOPELESS: NOT AT ALL
1. LITTLE INTEREST OR PLEASURE IN DOING THINGS: NOT AT ALL

## 2025-06-12 NOTE — PROGRESS NOTES
Virtual or Telephone Consent    An interactive audio and video telecommunication system which permits real time communications between the patient (at the originating site) and provider (at the distant site) was utilized to provide this telehealth service.   Verbal consent was requested and obtained from Cleo Corral on this date, 06/12/25 for a telehealth visit and the patient's location was confirmed at the time of the visit.      Division: Geriatrics  Date of Service: 06/12/25   Visit Type: Geriatrics Clinic Follow-up Visit    Subjective   Ms. Cleo Corral is 70 y.o. year old female and here for f/u of Follow-up, Grief, and Sleeping Problem. With patient's permission, this is a Telemedicine visit with video and audio. The provider and patient participated in this telemedicine encounter.  Virtually present with self. Collateral history obtained due to memory deficit:      Last visit: Per pt discussion/summary:   Previous Visit Note: 12/6/2024  Cleo Corral is a/an 69 y.o. female who presents today for a geriatric medicine and primary care follow-up. Patient presents for: Follow-up. Pt is alone  for virtual visit so the history is as accurate as and limited by her own report/memory. Their main concern today is recently moving her  to skilled nursing, needing a mammogram, and refilling potassium prescription. She reports her 's dementia had become overwhelming, especially at night and even with hired caregivers it was more than she could handle. He is now is LTC about 25 minutes away. She feels badly about moving him out of their home of 40+ years of marriage. She was somewhat tearful during the visit. Reports she is not getting the best sleep recently. Will ask for an intervention if it becomes too much.          HPI   History obtained from caregiver:NA  Hospitalization/ER visits:N  Memory:N  Mood changes: N  Sleep:Y   Falls:N  Med Changes/OTC  meds:N  Driving:Y  Pain:N  BM:N  Appetite:N  Weight:N  Home environment/Living Situation: Live alone but has great support from family and friends.   in 2025.    PCP: Cici Youngblood MD    Medical records reviewed.  Medical History[1]  Surgical History[2]  Family History[3]  Social History     Tobacco Use   • Smoking status: Never   • Smokeless tobacco: Never   Substance Use Topics   • Alcohol use: Not on file       Advance Directives/Legal/Financial    Patient Healthcare POA: {NA or comment:92653}         Is Healthcare POA currently scanned into patient's chart: {NA or comment:22378}      DPOA for finances: {NA or comment:78347}       Legal guardian:  {NA or comment:85526}     Living Will: {yes no:343832}     ? {yes no:905937}      ENCOUNTER SCREENING RESULTS                               MIS: ***/15    Clock Drawing Test (CDT): ***/ {AGCDT:36615}    Daily Functioning Assessment                       Safety       Living situation:       History of Abuse/Neglect/exploitation: ***      Medications reviewed and reconciled.   Current Medications[4]    Objective   There were no vitals taken for this visit.  Physical Exam      Labs/Imaging reviewed.  Lab Results   Component Value Date    WBC 6.3 2025    HGB 12.8 2025    HCT 39.9 2025    MCV 97.1 2025     2025    LYMPHOPCT 27.7 2021    RBC 4.11 2025    MCH 31.1 2025    MCHC 32.1 2025    RDW 13.4 2025     Lab Results   Component Value Date     2025    K 4.2 2025     2025    CO2 26 2025    BUN 18 2025    CREATININE 1.63 (H) 2025    GLUCOSE 100 (H) 2025    CALCIUM 8.9 2025    PROT 7.4 2021    BILITOT 0.5 2021    ALKPHOS 64 2021    AST 25 2021    ALT 30 2021     Lab Results   Component Value Date    TSH 0.92 2024    TSH 0.08 (L) 2024    TSH 0.07 (L) 2024     Lab Results  "  Component Value Date    FREET4 1.49 (H) 2024    FREET4 1.47 2024    FREET4 1.51 (H) 2020     No results found for: \"KIAUWWCR18\"  Lab Results   Component Value Date    HGBA1C 5.6 2023    HGBA1C 5.8 (A) 06/10/2022    HGBA1C 5.9 2021     Lab Results   Component Value Date    VITD25 36 2025    VITD25 38 2024    VITD25 34 07/15/2024        No results found for this or any previous visit from the past 365 days.     No results found for this or any previous visit from the past 365 days.     No results found for this or any previous visit from the past 365 days.      Assessment/Plan    {Assess/PlanSmartLinks:73816}    Discussed case with: {AGHXFROM:65482}    ***        Electronically signed by: OLIMPIA Vyas           [1]  Past Medical History:  Diagnosis Date   • Anxiety 2024   • Encounter for gynecological examination (general) (routine) without abnormal findings 2018    Well woman exam with routine gynecological exam   • Essential (primary) hypertension     HTN (hypertension), benign   • Folliculitis 2024   • Personal history of malignant neoplasm, unspecified     History of malignant neoplasm   [2]  Past Surgical History:  Procedure Laterality Date   •  SECTION, LOW TRANSVERSE  2015     Section Low Transverse   • OTHER SURGICAL HISTORY  2019    Cataract surgery   • OTHER SURGICAL HISTORY  2019    Hip replacement   [3]  Family History  Problem Relation Name Age of Onset   • Breast cancer Mother's Sister     [4]  Current Outpatient Medications   Medication Sig Dispense Refill   • amLODIPine (Norvasc) 5 mg tablet Take 1 tablet (5 mg) by mouth once daily. 90 tablet 3   • carvedilol (Coreg) 25 mg tablet Take 1 tablet (25 mg) by mouth 2 times a day. 180 tablet 3   • cholecalciferol (Vitamin D-3) 50 mcg (2,000 unit) capsule Take 1 capsule (50 mcg) by mouth once daily. 90 capsule 1   • famotidine (Pepcid) 20 mg tablet TAKE 1 " TABLET BY MOUTH EVERY DAY AS DIRECTED (Patient not taking: Reported on 4/11/2024) 90 tablet 1   • Farxiga 10 mg Take 1 tablet (10 mg) by mouth once daily. TAKE ONE TABLET BY MOUTH ONCE DAILY IN THE MORNING 90 tablet 3   • imatinib (Gleevec) 400 mg tablet Take 1 tablet (400 mg total) by mouth once every 24 hours.     • levothyroxine (Synthroid, Levoxyl) 125 mcg tablet Take 1 tablet (125 mcg) by mouth once daily. 90 tablet 3   • levothyroxine (Synthroid, Levoxyl) 125 mcg tablet Take 1 tablet (125 mcg) by mouth early in the morning.. Take on an empty stomach at the same time each day, either 30 to 60 minutes prior to breakfast 30 tablet 0   • lisinopril 40 mg tablet TAKE 1 TABLET ONCE DAILY 90 tablet 3   • lisinopril 40 mg tablet Take 1 tablet (40 mg) by mouth once daily. 30 tablet 0   • potassium chloride CR 10 mEq ER tablet TAKE 5 TABLETS (50 MEQ) BY MOUTH ONCE DAILY. DO NOT CRUSH, CHEW, OR SPLIT. 450 tablet 1     No current facility-administered medications for this visit.

## 2025-06-15 DIAGNOSIS — I12.9 HYPERTENSIVE KIDNEY DISEASE WITH STAGE 3B CHRONIC KIDNEY DISEASE (MULTI): ICD-10-CM

## 2025-06-15 DIAGNOSIS — N18.32 HYPERTENSIVE KIDNEY DISEASE WITH STAGE 3B CHRONIC KIDNEY DISEASE (MULTI): ICD-10-CM

## 2025-06-16 PROCEDURE — RXMED WILLOW AMBULATORY MEDICATION CHARGE

## 2025-06-16 RX ORDER — DAPAGLIFLOZIN 10 MG/1
10 TABLET, FILM COATED ORAL DAILY
Qty: 90 TABLET | Refills: 3 | Status: SHIPPED | OUTPATIENT
Start: 2025-06-16 | End: 2026-06-11

## 2025-06-19 ENCOUNTER — PHARMACY VISIT (OUTPATIENT)
Dept: PHARMACY | Facility: CLINIC | Age: 70
End: 2025-06-19
Payer: COMMERCIAL

## 2025-06-30 DIAGNOSIS — I12.9 HYPERTENSIVE KIDNEY DISEASE WITH STAGE 3B CHRONIC KIDNEY DISEASE (MULTI): ICD-10-CM

## 2025-06-30 DIAGNOSIS — N18.32 HYPERTENSIVE KIDNEY DISEASE WITH STAGE 3B CHRONIC KIDNEY DISEASE (MULTI): ICD-10-CM

## 2025-07-11 ENCOUNTER — HOSPITAL ENCOUNTER (OUTPATIENT)
Dept: RADIOLOGY | Facility: CLINIC | Age: 70
Discharge: HOME | End: 2025-07-11
Payer: MEDICARE

## 2025-07-11 ENCOUNTER — OFFICE VISIT (OUTPATIENT)
Dept: ORTHOPEDIC SURGERY | Facility: CLINIC | Age: 70
End: 2025-07-11
Payer: MEDICARE

## 2025-07-11 DIAGNOSIS — M17.12 PRIMARY OSTEOARTHRITIS OF LEFT KNEE: ICD-10-CM

## 2025-07-11 DIAGNOSIS — M17.11 PRIMARY OSTEOARTHRITIS OF RIGHT KNEE: Primary | ICD-10-CM

## 2025-07-11 DIAGNOSIS — M25.561 RIGHT KNEE PAIN, UNSPECIFIED CHRONICITY: ICD-10-CM

## 2025-07-11 PROCEDURE — 73564 X-RAY EXAM KNEE 4 OR MORE: CPT | Mod: 50

## 2025-07-11 RX ORDER — LISINOPRIL 40 MG/1
40 TABLET ORAL DAILY
Qty: 90 TABLET | Refills: 3 | Status: SHIPPED | OUTPATIENT
Start: 2025-07-11

## 2025-07-11 RX ADMIN — LIDOCAINE HYDROCHLORIDE 8 ML: 10 INJECTION, SOLUTION INFILTRATION; PERINEURAL at 18:23

## 2025-07-11 RX ADMIN — BUPIVACAINE HYDROCHLORIDE 4 ML: 5 INJECTION, SOLUTION PERINEURAL at 18:23

## 2025-07-11 RX ADMIN — LIDOCAINE HYDROCHLORIDE 8 ML: 10 INJECTION, SOLUTION INFILTRATION; PERINEURAL at 18:24

## 2025-07-11 RX ADMIN — BUPIVACAINE HYDROCHLORIDE 4 ML: 5 INJECTION, SOLUTION PERINEURAL at 18:24

## 2025-07-11 RX ADMIN — TRIAMCINOLONE ACETONIDE 40 MG: 40 INJECTION, SUSPENSION INTRA-ARTICULAR; INTRAMUSCULAR at 18:24

## 2025-07-11 RX ADMIN — TRIAMCINOLONE ACETONIDE 40 MG: 40 INJECTION, SUSPENSION INTRA-ARTICULAR; INTRAMUSCULAR at 18:23

## 2025-07-11 ASSESSMENT — PAIN - FUNCTIONAL ASSESSMENT: PAIN_FUNCTIONAL_ASSESSMENT: NO/DENIES PAIN

## 2025-07-11 NOTE — PROGRESS NOTES
Duyen Callahan MD   Adult Reconstruction and Joint Replacement Surgery  Phone: [phone]     Fax: [phone]       Name: Cleo Corral  Age: 70 y.o.   : 1955   Date of Visit: 2025        INITIAL CONSULTATION    CC: BILATERAL knee pain    Clinical History:  This patient presents with 1 years of BILATERAL knee pain.     Patient does not have pain at night. Patient does not report falls related to this problem. Patient is able to walk indoors only. Patient is currently using nothing as assistive device. Primarily complains of diffuse pain. The pain is significantly impacting their ability to perform activities of daily living. Patient reports no longer able to do activities such as walking. Patient has difficulty with climbing stairs, descending stairs, and walking . Patient has tried the following Activity modification, Physical therapy, Ice, and Xray. Date of last steroid injection: Cortisone, a few years ago cannot recall exactly what.     Focused History  *Directly transcribed from patient self-reported intake sheet and [facility] Medical Records.      PMH: Reviewed and PE/DVT: No  PSH: Reviewed , Hip/Knee replacement: no, Hip/Knee surgery: no, Anesthesia complications: no, Spine surgery: no, Surgical infection: no, and Weight loss surgery: no  SHx: Reviewed, Occupation: Retired schoolteacher, Current smoker: no, EtOH intake weekly: no, Social support: no, and Preferred physical activities: no  Jehovah´s Witness: no  Meds: Reviewed, Current Anticoagulants: no, Weight loss medication: no, and Current Opioids: no  Allergies: Reviewed  and The patient reports no contraindications or allergies to cephalosporins, aspirin, NSAIDs or opioids, except as noted above.  Dental Hx: Last routine cleanin and All invasive dental work must be completed 3+ months prior to joint replacement surgery. Dental cleaning must be completed 6+ weeks prior to joint replacement surgery. Patient are to avoid any  invasive dental work 3-6 months post-surgically.   FH: No family history of any bleeding or clotting disorders.    PROMs/HISTORY   PROMs   No questionnaires on file.     Medical History[1]    Medical History[2]  Documented in chart and reviewed.     Surgical History[3]    Allergies: She has no known allergies.     Medications:  Current Outpatient Medications   Medication Instructions    amLODIPine (NORVASC) 5 mg, oral, Daily    carvedilol (COREG) 25 mg, oral, 2 times daily    cholecalciferol (VITAMIN D-3) 50 mcg, oral, Daily    Farxiga 10 mg tablet Take 1 tablet (10 mg) by mouth once daily in the morning.    imatinib (GLEEVEC) 400 mg, Every 24 hours    levothyroxine (SYNTHROID, LEVOXYL) 125 mcg, oral, Daily    lisinopril 40 mg, oral, Daily    potassium chloride CR 10 mEq ER tablet TAKE 5 TABLETS (50 MEQ) BY MOUTH ONCE DAILY. DO NOT CRUSH, CHEW, OR SPLIT.       Family History[4]  Documented in chart and reviewed.     Social History     Tobacco Use    Smoking status: Never    Smokeless tobacco: Never   Substance Use Topics    Alcohol use: Not on file        Review of Systems: Review of systems completed with medical assistant intake. Please refer to this note.     Physical Exam:  BMI: 37.    General: The patient is well appearing and has an appropriate affect.     Neurological Examination: SILT in SPN/DPN/Sural/Saphenous/Tibial nerves. 5/5 EHL, FHL, Tibial anterior, Gastrocnemius. Coordination grossly intact.     Cardiovascular Exam: Capillary refill <2 seconds.     Lymphatic Examination: There is no obvious lymphatic swelling present around the involved joint.    Skin Exam: Skin around the pertinent joint is without evidence of infection or rash.    Gait: The patient ambulates with an antalgic gait.     Lumbar spine:    No tenderness to palpation midline.    Negative straight leg raise bilaterally.    Right Hip Examination:    There is no tenderness over the greater trochanter.    Range of motion is full extension to  100 degrees of flexion.    The hip is stable without subluxation or dislocation.    The hip internally rotates to 15 degrees and externally rotates to 45 degrees.    There is no pain with hip motion.    Left Hip Examination:    There is no tenderness over the greater trochanter.    Range of motion is full extension to 100 degrees of flexion.    The hip is stable without subluxation or dislocation.    The hip internally rotates to 15 degrees and externally rotates to 45 degrees.    There is no pain with hip motion.    Right Knee Examination:  Examination of the knee reveals the skin to be intact.    There is a moderate effusion in the knee.    The alignment of the knee is Valgus.    This deformity is not correctable.    There is tenderness to palpation over the joint line.    There is significant quadriceps atrophy.    Range of Motion: 15 to 110 degrees of flexion.    The knee is stable to varus-valgus stress and anterior-posterior stress.     There is moderate grinding with range of motion.    There is mild patellofemoral crepitus.    Left Knee Examination:  Examination of the knee reveals the skin to be intact.    There is a moderate effusion in the knee.    The alignment of the knee is Varus.    This deformity is not correctable.    There is tenderness to palpation over the joint line.    There is significant quadriceps atrophy.    Range of Motion: 15 to 110 degrees of flexion.    The knee is stable to varus-valgus stress and anterior-posterior stress.     There is moderate grinding with range of motion.    There is mild patellofemoral crepitus.    Prior Labs:   Prior Labs:   Lab Results   Component Value Date    WBC 6.3 06/03/2025    HGB 12.8 06/03/2025    HCT 39.9 06/03/2025    MCV 97.1 06/03/2025     06/03/2025      Lab Results   Component Value Date    INR 1.0 02/04/2019    PROTIME 11.2 02/04/2019         Lab Results   Component Value Date    GLUCOSE 100 (H) 06/03/2025    CALCIUM 8.9 06/03/2025    NA  "143 06/03/2025    K 4.2 06/03/2025    CO2 26 06/03/2025     06/03/2025    BUN 18 06/03/2025    CREATININE 1.63 (H) 06/03/2025      No results found for: \"CKTOTAL\", \"CKMB\", \"CKMBINDEX\", \"TROPONINI\"   Lab Results   Component Value Date    HGBA1C 5.6 02/01/2023         No results found for: \"CRP\"   No results found for: \"SEDRATE\"      Radiographs:  Radiographs were personally reviewed today. There is evidence of severe BILATERAL knee osteoarthritis with MEDIAL bone on bone apposition.    Impression:  This patient presents with severe BILATERAL knee osteoarthritis with bone on bone apposition.     Diagnosis:  Primary osteoarthritis of right knee    Right knee pain, unspecified chronicity    Primary osteoarthritis of left knee     Recommendations / Plan:    I have discussed the options in detail with the patient. We have discussed anti-inflammatory medication, activity modification, physical therapy, corticosteroid injections, viscosupplementation injections, partial knee replacement surgery and total knee replacement surgery. The patient has not yet exhausted all conservative treatment measures.    The risks and benefits of all these treatment options have been discussed in detail.     The patient has tried at least 3 months of the above conservative treatments and continues to have disabling pain, impaired activities of daily living and worsened quality of life.  Reviewed the surgical optimization steps to optimize their chances for a successful joint replacement surgery.      Currently their BMI is 37.  Discussed that obesity is a risk factor for continued progression of osteoarthritis. Each pound of weight loss offloads their hip and knee joints by 3-6 pounds.  The most effective of these options is weight loss mainly through restricting caloric intake.     A physical therapy prescription was ordered for the patient.  Patient will continue their home exercise program. Strategies for pain management using " over-the-counter anti-inflammatory medications reviewed.  The patient elects for a steroid injection, which was provided according to procedure note below. Discussed utility of brace. Econ brace was fit for the patient today. . Encouraged them to maintain range of motion and strength around the knee joints.  They will continue to implement these strategies in addressing their pain.       Recommend the patient continue optimizing nonsurgical treatment interventions as outlined above for management of their knee arthritis.  I would be happy to see them again at any point to discuss surgery if indicated or they are more optimized or to review progress with nonsurgical treatment of arthritis. The patient verbalizes understanding with the recommendations and treatment plan as outlined above and is in agreement.  Questions were addressed.    Large Joint Injection [id number]: Knee  Consent given by: Patient  Timeout: Immediately prior to procedure the correct patient, procedure, and site was verified. Sterile gloves and semi-sterile technique were used.   Indications: Knee pain and joint swelling.   Location: BILATERAL knee  Needle size: 22 G  Approach: Lateral    Medications administered: please see medical assistant note for lot number and expiration date.   Subcutaneous   4 ml of 1% lidocaine     Deep   4 ml of 1% lidocaine   4 mL 0.5% marcaine   1 mL of 40 mg kenalog     Patient tolerance: Dressing applied. Patient tolerated the procedure well with no immediate complications.    ***    _____________  Duyen Callahan MD   Attending Orthopaedic Surgeon  [facility]    [facility]       This office note was transcribed with dictation software.  Please excuse any typographical errors, program misunderstandings leading to inadvertent insertions or deletions of inappropriate wording, pronoun errors and other unintentional transcription errors not noticed on  proof-reading.                             [1]   Past Medical History:  Diagnosis Date    Anxiety 2024    Encounter for gynecological examination (general) (routine) without abnormal findings 2018    Well woman exam with routine gynecological exam    Essential (primary) hypertension     HTN (hypertension), benign    Folliculitis 2024    Personal history of malignant neoplasm, unspecified     History of malignant neoplasm   [2]   Past Medical History:  Diagnosis Date    Anxiety 2024    Encounter for gynecological examination (general) (routine) without abnormal findings 2018    Well woman exam with routine gynecological exam    Essential (primary) hypertension     HTN (hypertension), benign    Folliculitis 2024    Personal history of malignant neoplasm, unspecified     History of malignant neoplasm   [3]   Past Surgical History:  Procedure Laterality Date     SECTION, LOW TRANSVERSE  2015     Section Low Transverse    OTHER SURGICAL HISTORY  2019    Cataract surgery    OTHER SURGICAL HISTORY  2019    Hip replacement   [4]   Family History  Problem Relation Name Age of Onset    Breast cancer Mother's Sister        hypertension     HTN (hypertension), benign    Folliculitis 2024    Personal history of malignant neoplasm, unspecified     History of malignant neoplasm   [3]   Past Surgical History:  Procedure Laterality Date     SECTION, LOW TRANSVERSE  2015     Section Low Transverse    OTHER SURGICAL HISTORY  2019    Cataract surgery    OTHER SURGICAL HISTORY  2019    Hip replacement   [4]   Family History  Problem Relation Name Age of Onset    Breast cancer Mother's Sister

## 2025-07-17 ENCOUNTER — APPOINTMENT (OUTPATIENT)
Dept: PHARMACY | Facility: HOSPITAL | Age: 70
End: 2025-07-17
Payer: MEDICARE

## 2025-07-21 DIAGNOSIS — E87.6 HYPOKALEMIA: ICD-10-CM

## 2025-07-21 RX ORDER — POTASSIUM CHLORIDE 750 MG/1
40 TABLET, FILM COATED, EXTENDED RELEASE ORAL DAILY
Qty: 360 TABLET | Refills: 1 | Status: SHIPPED | OUTPATIENT
Start: 2025-07-21 | End: 2026-01-17

## 2025-07-26 RX ORDER — BUPIVACAINE HYDROCHLORIDE 5 MG/ML
4 INJECTION, SOLUTION PERINEURAL
Status: COMPLETED | OUTPATIENT
Start: 2025-07-11 | End: 2025-07-11

## 2025-07-26 RX ORDER — TRIAMCINOLONE ACETONIDE 40 MG/ML
40 INJECTION, SUSPENSION INTRA-ARTICULAR; INTRAMUSCULAR
Status: COMPLETED | OUTPATIENT
Start: 2025-07-11 | End: 2025-07-11

## 2025-07-26 RX ORDER — LIDOCAINE HYDROCHLORIDE 10 MG/ML
8 INJECTION, SOLUTION INFILTRATION; PERINEURAL
Status: COMPLETED | OUTPATIENT
Start: 2025-07-11 | End: 2025-07-11

## 2025-07-28 ENCOUNTER — APPOINTMENT (OUTPATIENT)
Dept: PHARMACY | Facility: HOSPITAL | Age: 70
End: 2025-07-28
Payer: MEDICARE

## 2025-07-28 DIAGNOSIS — I12.9 HYPERTENSIVE KIDNEY DISEASE WITH STAGE 3B CHRONIC KIDNEY DISEASE (MULTI): Primary | ICD-10-CM

## 2025-07-28 DIAGNOSIS — N18.32 HYPERTENSIVE KIDNEY DISEASE WITH STAGE 3B CHRONIC KIDNEY DISEASE (MULTI): Primary | ICD-10-CM

## 2025-07-28 NOTE — PROGRESS NOTES
Subjective   Patient ID: Cleo Corral is a 70 y.o. female who presents for CKD    Referring Provider: Isi RODRIGUEZ  HPI: CKD stage 3b/a2 last EGFR 30 sCr 1.8 UACR 114.8     HTN:   BP: 173/102 at last ov.  Home BP readings:  115-148  130s generally    BP   Medications  Carvedilol 25 mg bid  Lisinopril 40mg every day  Amlodipine 5mg    glucose: well controlled and monitored  A1C 5.6  Farxiga 10 mg once daily    HLD:    On statin? no    Medications reviewed for appropriate dosing in setting of CKD  Recommended changes:  - no adjustments needed at this time    Objective     There were no vitals taken for this visit.     Labs  Lab Results   Component Value Date    BILITOT 0.5 03/31/2021    CALCIUM 8.9 06/03/2025    CO2 26 06/03/2025     06/03/2025    CREATININE 1.63 (H) 06/03/2025    GLUCOSE 100 (H) 06/03/2025    ALKPHOS 64 03/31/2021    K 4.2 06/03/2025    PROT 7.4 07/12/2021     06/03/2025    AST 25 03/31/2021    ALT 30 03/31/2021    BUN 18 06/03/2025    ANIONGAP 10 11/07/2024    MG 2.18 04/11/2024    PHOS 3.2 06/03/2025    ALBUMIN 4.3 06/03/2025    GFRF 39 (A) 04/28/2023     Lab Results   Component Value Date    TRIG 274 (H) 02/01/2023    CHOL 200 (H) 02/01/2023    HDL 41.3 02/01/2023     Lab Results   Component Value Date    HGBA1C 5.6 02/01/2023       Current Outpatient Medications on File Prior to Visit   Medication Sig Dispense Refill    amLODIPine (Norvasc) 5 mg tablet Take 1 tablet (5 mg) by mouth once daily. 90 tablet 3    carvedilol (Coreg) 25 mg tablet Take 1 tablet (25 mg) by mouth 2 times a day. 180 tablet 3    cholecalciferol (Vitamin D-3) 50 mcg (2,000 units) capsule Take 1 capsule (50 mcg) by mouth once daily. 90 capsule 1    Farxiga 10 mg tablet Take 1 tablet (10 mg) by mouth once daily in the morning. 90 tablet 3    imatinib (Gleevec) 400 mg tablet Take 1 tablet (400 mg total) by mouth once every 24 hours.      levothyroxine (Synthroid, Levoxyl) 125 mcg tablet Take 1  tablet (125 mcg) by mouth once daily. 90 tablet 3    lisinopril 40 mg tablet TAKE 1 TABLET ONCE DAILY 90 tablet 3    potassium chloride CR 10 mEq ER tablet Take 4 tablets (40 mEq) by mouth once daily. Do not crush, chew, or split. 360 tablet 1     No current facility-administered medications on file prior to visit.        Assessment/Plan   PATIENT EDUCATION/DISCUSSION:  - Counseled patient on MOA, expectations, side effects, duration of therapy, contraindications, administration, and monitoring parameters  - Answered all patient questions and concerns  - enrolled in  patient assistance [facility]halini 4/8/26  - follow up on bps next month then lab review in dec  _______________________________________________________________________  PLAN  1.  Continue all other medications.  2. Next check-in will be 8/28@ 10am    Duy Sutton, PharmD    Continue all meds under the continuation of care with the referring provider and clinical pharmacy team.

## 2025-07-30 ENCOUNTER — TELEPHONE (OUTPATIENT)
Dept: GERIATRIC MEDICINE | Facility: CLINIC | Age: 70
End: 2025-07-30
Payer: MEDICARE

## 2025-07-30 NOTE — TELEPHONE ENCOUNTER
Cleo called about her prescription for potassium chloride CR 10 mEq ER tablet.  She said her refill from [facility] says to take it 4 times a day whereas in the past, her [facility] prescription always said to take it 5 times a day. She would like clarification. She said if she does not answer the phone.  Leave a message.

## 2025-08-06 DIAGNOSIS — I12.9 HYPERTENSIVE KIDNEY DISEASE WITH STAGE 3B CHRONIC KIDNEY DISEASE (MULTI): ICD-10-CM

## 2025-08-06 DIAGNOSIS — N18.32 HYPERTENSIVE KIDNEY DISEASE WITH STAGE 3B CHRONIC KIDNEY DISEASE (MULTI): ICD-10-CM

## 2025-08-06 RX ORDER — CARVEDILOL 25 MG/1
25 TABLET ORAL 2 TIMES DAILY
Qty: 180 TABLET | Refills: 3 | Status: SHIPPED | OUTPATIENT
Start: 2025-08-06 | End: 2026-08-06

## 2025-08-14 ENCOUNTER — EVALUATION (OUTPATIENT)
Dept: PHYSICAL THERAPY | Facility: CLINIC | Age: 70
End: 2025-08-14
Payer: MEDICARE

## 2025-08-14 DIAGNOSIS — R26.2 DIFFICULTY WALKING: ICD-10-CM

## 2025-08-14 DIAGNOSIS — M17.0 BILATERAL PRIMARY OSTEOARTHRITIS OF KNEE: Primary | ICD-10-CM

## 2025-08-14 PROCEDURE — 97140 MANUAL THERAPY 1/> REGIONS: CPT | Mod: GP | Performed by: PHYSICAL THERAPIST

## 2025-08-14 PROCEDURE — 97161 PT EVAL LOW COMPLEX 20 MIN: CPT | Mod: GP | Performed by: PHYSICAL THERAPIST

## 2025-08-14 PROCEDURE — 97110 THERAPEUTIC EXERCISES: CPT | Mod: GP | Performed by: PHYSICAL THERAPIST

## 2025-08-28 ENCOUNTER — APPOINTMENT (OUTPATIENT)
Dept: PHARMACY | Facility: HOSPITAL | Age: 70
End: 2025-08-28
Payer: MEDICARE

## 2025-08-28 DIAGNOSIS — I12.9 HYPERTENSIVE KIDNEY DISEASE WITH STAGE 3B CHRONIC KIDNEY DISEASE (MULTI): ICD-10-CM

## 2025-08-28 DIAGNOSIS — N18.32 HYPERTENSIVE KIDNEY DISEASE WITH STAGE 3B CHRONIC KIDNEY DISEASE (MULTI): ICD-10-CM

## 2025-09-02 ENCOUNTER — OFFICE VISIT (OUTPATIENT)
Dept: GERIATRIC MEDICINE | Facility: CLINIC | Age: 70
End: 2025-09-02
Payer: MEDICARE

## 2025-09-02 ENCOUNTER — APPOINTMENT (OUTPATIENT)
Dept: GERIATRIC MEDICINE | Facility: CLINIC | Age: 70
End: 2025-09-02
Payer: MEDICARE

## 2025-09-02 VITALS
SYSTOLIC BLOOD PRESSURE: 138 MMHG | TEMPERATURE: 97.3 F | DIASTOLIC BLOOD PRESSURE: 84 MMHG | BODY MASS INDEX: 36.56 KG/M2 | WEIGHT: 226.4 LBS | HEART RATE: 66 BPM

## 2025-09-02 DIAGNOSIS — E55.9 VITAMIN D DEFICIENCY: ICD-10-CM

## 2025-09-02 DIAGNOSIS — Z85.6 HISTORY OF CHRONIC MYELOID LEUKEMIA: ICD-10-CM

## 2025-09-02 DIAGNOSIS — E03.9 HYPOTHYROIDISM, UNSPECIFIED TYPE: ICD-10-CM

## 2025-09-02 DIAGNOSIS — E04.2 MULTIPLE THYROID NODULES: ICD-10-CM

## 2025-09-02 DIAGNOSIS — Z12.31 ENCOUNTER FOR SCREENING MAMMOGRAM FOR MALIGNANT NEOPLASM OF BREAST: ICD-10-CM

## 2025-09-02 DIAGNOSIS — I10 BENIGN ESSENTIAL HYPERTENSION: Primary | ICD-10-CM

## 2025-09-02 DIAGNOSIS — R73.9 HYPERGLYCEMIA: ICD-10-CM

## 2025-09-02 DIAGNOSIS — I12.9 HYPERTENSIVE KIDNEY DISEASE WITH STAGE 3B CHRONIC KIDNEY DISEASE (MULTI): ICD-10-CM

## 2025-09-02 DIAGNOSIS — E78.1 HIGH BLOOD TRIGLYCERIDES: ICD-10-CM

## 2025-09-02 DIAGNOSIS — Z00.00 HEALTHCARE MAINTENANCE: ICD-10-CM

## 2025-09-02 DIAGNOSIS — M17.0 PRIMARY OSTEOARTHRITIS OF BOTH KNEES: ICD-10-CM

## 2025-09-02 DIAGNOSIS — E66.812 CLASS 2 OBESITY WITHOUT SERIOUS COMORBIDITY IN ADULT, UNSPECIFIED BMI, UNSPECIFIED OBESITY TYPE: ICD-10-CM

## 2025-09-02 DIAGNOSIS — Z23 NEED FOR VACCINATION: ICD-10-CM

## 2025-09-02 DIAGNOSIS — N18.32 HYPERTENSIVE KIDNEY DISEASE WITH STAGE 3B CHRONIC KIDNEY DISEASE (MULTI): ICD-10-CM

## 2025-09-02 PROBLEM — E66.01 MORBID OBESITY (MULTI): Status: RESOLVED | Noted: 2024-01-04 | Resolved: 2025-09-02

## 2025-09-02 PROCEDURE — 36415 COLL VENOUS BLD VENIPUNCTURE: CPT | Performed by: INTERNAL MEDICINE

## 2025-09-02 PROCEDURE — 1159F MED LIST DOCD IN RCRD: CPT | Performed by: INTERNAL MEDICINE

## 2025-09-02 PROCEDURE — 99215 OFFICE O/P EST HI 40 MIN: CPT | Mod: 25 | Performed by: INTERNAL MEDICINE

## 2025-09-02 PROCEDURE — 1160F RVW MEDS BY RX/DR IN RCRD: CPT | Performed by: INTERNAL MEDICINE

## 2025-09-02 PROCEDURE — 1036F TOBACCO NON-USER: CPT | Performed by: INTERNAL MEDICINE

## 2025-09-02 PROCEDURE — G0439 PPPS, SUBSEQ VISIT: HCPCS | Performed by: INTERNAL MEDICINE

## 2025-09-02 PROCEDURE — 1126F AMNT PAIN NOTED NONE PRSNT: CPT | Performed by: INTERNAL MEDICINE

## 2025-09-02 PROCEDURE — 99214 OFFICE O/P EST MOD 30 MIN: CPT | Mod: 25 | Performed by: INTERNAL MEDICINE

## 2025-09-02 PROCEDURE — 3075F SYST BP GE 130 - 139MM HG: CPT | Performed by: INTERNAL MEDICINE

## 2025-09-02 PROCEDURE — 99214 OFFICE O/P EST MOD 30 MIN: CPT | Performed by: INTERNAL MEDICINE

## 2025-09-02 PROCEDURE — 3079F DIAST BP 80-89 MM HG: CPT | Performed by: INTERNAL MEDICINE

## 2025-09-02 PROCEDURE — 90656 IIV3 VACC NO PRSV 0.5 ML IM: CPT | Performed by: INTERNAL MEDICINE

## 2025-09-02 PROCEDURE — 1170F FXNL STATUS ASSESSED: CPT | Performed by: INTERNAL MEDICINE

## 2025-09-02 ASSESSMENT — ACTIVITIES OF DAILY LIVING (ADL)
TAKING_MEDICATION: INDEPENDENT
BATHING: INDEPENDENT
MANAGING_FINANCES: INDEPENDENT
DOING_HOUSEWORK: INDEPENDENT
DRESSING: INDEPENDENT
GROCERY_SHOPPING: INDEPENDENT

## 2025-09-02 ASSESSMENT — PAIN SCALES - GENERAL: PAINLEVEL_OUTOF10: 0-NO PAIN

## 2025-09-02 ASSESSMENT — MINI COG
PATIENT WAS GIVEN REPEAT BACK WORDS FROM VERSION: VERSION 1
NUMBER_WORDS_RECALLED: THREE WORDS RECALLED
WORD_RECALL_SCORE: 3
CLOCK_DRAW_SCORE: 2
TOTAL SCORE: 5

## 2025-09-02 ASSESSMENT — ENCOUNTER SYMPTOMS
LOSS OF SENSATION IN FEET: 0
OCCASIONAL FEELINGS OF UNSTEADINESS: 1

## 2025-09-02 ASSESSMENT — PATIENT HEALTH QUESTIONNAIRE - PHQ9
1. LITTLE INTEREST OR PLEASURE IN DOING THINGS: NOT AT ALL
2. FEELING DOWN, DEPRESSED OR HOPELESS: NOT AT ALL
SUM OF ALL RESPONSES TO PHQ9 QUESTIONS 1 AND 2: 0

## 2025-09-03 DIAGNOSIS — E03.9 HYPOTHYROIDISM, UNSPECIFIED TYPE: ICD-10-CM

## 2025-09-03 LAB
ALBUMIN SERPL-MCNC: 4 G/DL (ref 3.6–5.1)
BUN SERPL-MCNC: 22 MG/DL (ref 7–25)
BUN/CREAT SERPL: 15 (CALC) (ref 6–22)
CALCIUM SERPL-MCNC: 9.2 MG/DL (ref 8.6–10.4)
CHLORIDE SERPL-SCNC: 108 MMOL/L (ref 98–110)
CHOLEST SERPL-MCNC: 195 MG/DL
CHOLEST/HDLC SERPL: 3 (CALC)
CO2 SERPL-SCNC: 21 MMOL/L (ref 20–32)
CREAT SERPL-MCNC: 1.49 MG/DL (ref 0.6–1)
EGFRCR SERPLBLD CKD-EPI 2021: 37 ML/MIN/1.73M2
EST. AVERAGE GLUCOSE BLD GHB EST-MCNC: 120 MG/DL
EST. AVERAGE GLUCOSE BLD GHB EST-SCNC: 6.6 MMOL/L
GLUCOSE SERPL-MCNC: 101 MG/DL (ref 65–99)
HBA1C MFR BLD: 5.8 %
HDLC SERPL-MCNC: 64 MG/DL
LDLC SERPL CALC-MCNC: 110 MG/DL (CALC)
NONHDLC SERPL-MCNC: 131 MG/DL (CALC)
PHOSPHATE SERPL-MCNC: 3.6 MG/DL (ref 2.1–4.3)
POTASSIUM SERPL-SCNC: 3.9 MMOL/L (ref 3.5–5.3)
SODIUM SERPL-SCNC: 142 MMOL/L (ref 135–146)
T4 FREE SERPL-MCNC: 1.6 NG/DL (ref 0.8–1.8)
TRIGL SERPL-MCNC: 107 MG/DL
TSH SERPL-ACNC: 0.19 MIU/L (ref 0.4–4.5)
VIT B12 SERPL-MCNC: 201 PG/ML (ref 200–1100)

## 2025-09-03 RX ORDER — LANOLIN ALCOHOL/MO/W.PET/CERES
1000 CREAM (GRAM) TOPICAL DAILY
COMMUNITY

## 2025-09-03 RX ORDER — LEVOTHYROXINE SODIUM 125 UG/1
TABLET ORAL
Qty: 90 TABLET | Refills: 3 | Status: SHIPPED | OUTPATIENT
Start: 2025-09-03

## 2025-09-04 ENCOUNTER — HOSPITAL ENCOUNTER (OUTPATIENT)
Dept: RADIOLOGY | Facility: CLINIC | Age: 70
Discharge: HOME | End: 2025-09-04
Payer: MEDICARE

## 2025-09-04 DIAGNOSIS — E03.9 HYPOTHYROIDISM, UNSPECIFIED TYPE: ICD-10-CM

## 2025-09-04 PROCEDURE — 76536 US EXAM OF HEAD AND NECK: CPT

## 2025-09-04 PROCEDURE — 76536 US EXAM OF HEAD AND NECK: CPT | Performed by: RADIOLOGY

## 2025-12-18 ENCOUNTER — APPOINTMENT (OUTPATIENT)
Dept: PHARMACY | Facility: HOSPITAL | Age: 70
End: 2025-12-18
Payer: MEDICARE

## 2026-06-09 ENCOUNTER — APPOINTMENT (OUTPATIENT)
Dept: NEPHROLOGY | Facility: CLINIC | Age: 71
End: 2026-06-09
Payer: MEDICARE